# Patient Record
Sex: FEMALE | Race: BLACK OR AFRICAN AMERICAN | NOT HISPANIC OR LATINO | Employment: FULL TIME | URBAN - METROPOLITAN AREA
[De-identification: names, ages, dates, MRNs, and addresses within clinical notes are randomized per-mention and may not be internally consistent; named-entity substitution may affect disease eponyms.]

---

## 2022-02-25 ENCOUNTER — OFFICE VISIT (OUTPATIENT)
Dept: FAMILY MEDICINE | Facility: CLINIC | Age: 57
End: 2022-02-25
Payer: COMMERCIAL

## 2022-02-25 VITALS
HEIGHT: 63 IN | BODY MASS INDEX: 36.73 KG/M2 | RESPIRATION RATE: 16 BRPM | SYSTOLIC BLOOD PRESSURE: 142 MMHG | HEART RATE: 77 BPM | DIASTOLIC BLOOD PRESSURE: 82 MMHG | WEIGHT: 207.3 LBS | OXYGEN SATURATION: 98 % | TEMPERATURE: 97.8 F

## 2022-02-25 DIAGNOSIS — Z12.31 ENCOUNTER FOR SCREENING MAMMOGRAM FOR BREAST CANCER: ICD-10-CM

## 2022-02-25 DIAGNOSIS — Z12.4 SCREENING FOR MALIGNANT NEOPLASM OF CERVIX: ICD-10-CM

## 2022-02-25 DIAGNOSIS — Z11.59 NEED FOR HEPATITIS C SCREENING TEST: ICD-10-CM

## 2022-02-25 DIAGNOSIS — Z13.29 SCREENING FOR THYROID DISORDER: ICD-10-CM

## 2022-02-25 DIAGNOSIS — Z00.00 ROUTINE GENERAL MEDICAL EXAMINATION AT A HEALTH CARE FACILITY: Primary | ICD-10-CM

## 2022-02-25 DIAGNOSIS — E55.9 VITAMIN D DEFICIENCY: ICD-10-CM

## 2022-02-25 DIAGNOSIS — Z13.1 SCREENING FOR DIABETES MELLITUS (DM): ICD-10-CM

## 2022-02-25 DIAGNOSIS — Z13.6 CARDIOVASCULAR SCREENING; LDL GOAL LESS THAN 160: ICD-10-CM

## 2022-02-25 DIAGNOSIS — R03.0 ELEVATED BP WITHOUT DIAGNOSIS OF HYPERTENSION: ICD-10-CM

## 2022-02-25 DIAGNOSIS — Z13.0 SCREENING FOR DISORDER OF BLOOD AND BLOOD-FORMING ORGANS: ICD-10-CM

## 2022-02-25 DIAGNOSIS — Z12.11 SCREEN FOR COLON CANCER: ICD-10-CM

## 2022-02-25 PROBLEM — D25.1 INTRAMURAL LEIOMYOMA OF UTERUS: Status: ACTIVE | Noted: 2017-05-15

## 2022-02-25 LAB
ALBUMIN SERPL-MCNC: 3.8 G/DL (ref 3.4–5)
ALP SERPL-CCNC: 55 U/L (ref 40–150)
ALT SERPL W P-5'-P-CCNC: 20 U/L (ref 0–50)
ANION GAP SERPL CALCULATED.3IONS-SCNC: 6 MMOL/L (ref 3–14)
AST SERPL W P-5'-P-CCNC: 16 U/L (ref 0–45)
BILIRUB SERPL-MCNC: 1.3 MG/DL (ref 0.2–1.3)
BUN SERPL-MCNC: 10 MG/DL (ref 7–30)
CALCIUM SERPL-MCNC: 9 MG/DL (ref 8.5–10.1)
CHLORIDE BLD-SCNC: 108 MMOL/L (ref 94–109)
CHOLEST SERPL-MCNC: 224 MG/DL
CO2 SERPL-SCNC: 26 MMOL/L (ref 20–32)
CREAT SERPL-MCNC: 0.73 MG/DL (ref 0.52–1.04)
ERYTHROCYTE [DISTWIDTH] IN BLOOD BY AUTOMATED COUNT: 12.9 % (ref 10–15)
FASTING STATUS PATIENT QL REPORTED: YES
GFR SERPL CREATININE-BSD FRML MDRD: >90 ML/MIN/1.73M2
GLUCOSE BLD-MCNC: 98 MG/DL (ref 70–99)
HCT VFR BLD AUTO: 43.4 % (ref 35–47)
HDLC SERPL-MCNC: 88 MG/DL
HGB BLD-MCNC: 14.1 G/DL (ref 11.7–15.7)
LDLC SERPL CALC-MCNC: 122 MG/DL
MCH RBC QN AUTO: 29.6 PG (ref 26.5–33)
MCHC RBC AUTO-ENTMCNC: 32.5 G/DL (ref 31.5–36.5)
MCV RBC AUTO: 91 FL (ref 78–100)
NONHDLC SERPL-MCNC: 136 MG/DL
PLATELET # BLD AUTO: 314 10E3/UL (ref 150–450)
POTASSIUM BLD-SCNC: 3.7 MMOL/L (ref 3.4–5.3)
PROT SERPL-MCNC: 7.2 G/DL (ref 6.8–8.8)
RBC # BLD AUTO: 4.77 10E6/UL (ref 3.8–5.2)
SODIUM SERPL-SCNC: 140 MMOL/L (ref 133–144)
TRIGL SERPL-MCNC: 69 MG/DL
TSH SERPL DL<=0.005 MIU/L-ACNC: 0.81 MU/L (ref 0.4–4)
WBC # BLD AUTO: 4 10E3/UL (ref 4–11)

## 2022-02-25 PROCEDURE — 86803 HEPATITIS C AB TEST: CPT | Performed by: NURSE PRACTITIONER

## 2022-02-25 PROCEDURE — 80061 LIPID PANEL: CPT | Performed by: NURSE PRACTITIONER

## 2022-02-25 PROCEDURE — 82306 VITAMIN D 25 HYDROXY: CPT | Performed by: NURSE PRACTITIONER

## 2022-02-25 PROCEDURE — G0145 SCR C/V CYTO,THINLAYER,RESCR: HCPCS | Performed by: NURSE PRACTITIONER

## 2022-02-25 PROCEDURE — 87624 HPV HI-RISK TYP POOLED RSLT: CPT | Performed by: NURSE PRACTITIONER

## 2022-02-25 PROCEDURE — 36415 COLL VENOUS BLD VENIPUNCTURE: CPT | Performed by: NURSE PRACTITIONER

## 2022-02-25 PROCEDURE — 84443 ASSAY THYROID STIM HORMONE: CPT | Performed by: NURSE PRACTITIONER

## 2022-02-25 PROCEDURE — 80053 COMPREHEN METABOLIC PANEL: CPT | Performed by: NURSE PRACTITIONER

## 2022-02-25 PROCEDURE — 99386 PREV VISIT NEW AGE 40-64: CPT | Performed by: NURSE PRACTITIONER

## 2022-02-25 PROCEDURE — 85027 COMPLETE CBC AUTOMATED: CPT | Performed by: NURSE PRACTITIONER

## 2022-02-25 RX ORDER — FAMOTIDINE 20 MG
TABLET ORAL
COMMUNITY

## 2022-02-25 ASSESSMENT — ENCOUNTER SYMPTOMS
DIARRHEA: 0
CONSTIPATION: 0
COUGH: 0
ABDOMINAL PAIN: 0
DIZZINESS: 0
CHILLS: 0
HEMATURIA: 0
HEMATOCHEZIA: 0

## 2022-02-25 ASSESSMENT — PAIN SCALES - GENERAL: PAINLEVEL: NO PAIN (0)

## 2022-02-25 NOTE — RESULT ENCOUNTER NOTE
James De Santiago,    Attached are your test results.  -LDL(bad) cholesterol level is elevated which can increase your heart disease risk.  A diet high in fat and simple carbohydrates, genetics and being overweight can contribute to this. ADVISE: exercising 150 minutes of aerobic exercise per week (30 minutes for 5 days per week or 50 minutes for 3 days per week are options) and eating a low saturated fat/low carbohydrate diet are helpful to improve this. In 12 months, you should recheck your fasting cholesterol panel by scheduling a lab-only appointment.  -Liver and gallbladder tests are normal (ALT,AST, Alk phos, bilirubin), kidney function is normal (Cr, GFR), sodium is normal, potassium is normal, calcium is normal, glucose is normal.  -TSH (thyroid stimulating hormone) level is normal which indicates normal thyroid function.   Please contact us if you have any questions.    Denise Douglas, CNP

## 2022-02-25 NOTE — RESULT ENCOUNTER NOTE
James De Santiago,    Attached are your test results.  -Normal red blood cell (hgb) levels, normal white blood cell count and normal platelet levels.   Please contact us if you have any questions.    Denise Douglas, CNP

## 2022-02-25 NOTE — PROGRESS NOTES
SUBJECTIVE:   CC: Radha De Snatiago is an 56 year old woman who presents for preventive health visit.     Patient has been advised of split billing requirements and indicates understanding: Yes  Healthy Habits:     Getting at least 3 servings of Calcium per day:  Yes    Bi-annual eye exam:  Yes    Dental care twice a year:  NO    Sleep apnea or symptoms of sleep apnea:  None    Diet:  Regular (no restrictions)    Frequency of exercise:  2-3 days/week    Duration of exercise:  30-45 minutes    Taking medications regularly:  Yes    Medication side effects:  Other    PHQ-2 Total Score: 0    Additional concerns today:  No    Establish care     Today's PHQ-2 Score:   PHQ-2 ( 1999 [employer]) 2/25/2022   Q1: Little interest or pleasure in doing things 0   Q2: Feeling down, depressed or hopeless 0   PHQ-2 Score 0   Q1: Little interest or pleasure in doing things Not at all   Q2: Feeling down, depressed or hopeless Not at all   PHQ-2 Score 0       Abuse: Current or Past (Physical, Sexual or Emotional) - No  Do you feel safe in your environment? Yes    Have you ever done Advance Care Planning? (For example, a Health Directive, POLST, or a discussion with a medical provider or your loved ones about your wishes): Yes, advance care planning is on file.    Social History     Tobacco Use     Smoking status: Never Smoker     Smokeless tobacco: Never Used   Substance Use Topics     Alcohol use: Not Currently         Alcohol Use 2/25/2022   Prescreen: >3 drinks/day or >7 drinks/week? Not Applicable       Reviewed orders with patient.  Reviewed health maintenance and updated orders accordingly - Yes  Lab work is in process  Labs reviewed in EPIC  BP Readings from Last 3 Encounters:   02/25/22 (!) 142/82    Wt Readings from Last 3 Encounters:   02/25/22 94 kg (207 lb 4.8 oz)                  Patient Active Problem List   Diagnosis     Chondromalacia of patella     Intramural leiomyoma of uterus     Vitamin D deficiency     Past  Surgical History:   Procedure Laterality Date     HRW HB PF ASPIR/INJ GANGLION CYST Left     wrist       Social History     Tobacco Use     Smoking status: Never Smoker     Smokeless tobacco: Never Used   Substance Use Topics     Alcohol use: Not Currently     Family History   Problem Relation Age of Onset     Hypertension Mother      Thyroid Disease Mother      Coronary Artery Disease Father      Breast Cancer Maternal Aunt      Ovarian Cancer Paternal Aunt      Diabetes No family hx of      Hyperlipidemia No family hx of      Kidney Disease No family hx of      Cerebrovascular Disease No family hx of      Depression No family hx of      Mental Illness No family hx of      Uterine Cancer No family hx of      Prostate Cancer No family hx of      Colorectal Cancer No family hx of      Pancreatic Cancer No family hx of      Lung Cancer No family hx of      Melanoma No family hx of      Autoimmune Disease No family hx of          Current Outpatient Medications   Medication Sig Dispense Refill     Vitamin D, Cholecalciferol, 25 MCG (1000 UT) CAPS        Allergies   Allergen Reactions     Calcitonin (Isabella) Anaphylaxis     Pyrimethamine Anaphylaxis     Pt gets diffuse swelling  (metakelfin)     Other Environmental Allergy      PN: LW Reaction: metacalfin for malaria in [facility]     Acetaminophen Rash       Breast Cancer Screening:    Breast CA Risk Assessment (FHS-7) 2/25/2022   Do you have a family history of breast, colon, or ovarian cancer? No / Unknown         Mammogram Screening: Recommended mammography every 1-2 years with patient discussion and risk factor consideration  Pertinent mammograms are reviewed under the imaging tab.    History of abnormal Pap smear: NO - age 30- 65 PAP every 3 years recommended     Reviewed and updated as needed this visit by clinical staff                  Reviewed and updated as needed this visit by Provider                 Past Medical History:   Diagnosis Date     Chondromalacia of  "patella 4/26/2013      Past Surgical History:   Procedure Laterality Date     HRW HB PF ASPIR/INJ GANGLION CYST Left     wrist       Review of Systems   Constitutional: Negative for chills.   HENT: Negative for congestion.    Respiratory: Negative for cough.    Cardiovascular: Negative for chest pain.   Gastrointestinal: Negative for abdominal pain, constipation, diarrhea and hematochezia.   Genitourinary: Negative for hematuria.   Neurological: Negative for dizziness.     CONSTITUTIONAL:POSITIVE  for weight gain and almost 30 lbs  and NEGATIVE  for sweats  INTEGUMENTARY/SKIN: NEGATIVE for worrisome rashes, moles or lesions  EYES: POSITIVE for wears glasses  and NEGATIVE for blurred vision bilateral, diplopia and eye pain bilateral  ENT: NEGATIVE for ear, mouth and throat problems  RESP:NEGATIVE for significant cough or SOB  BREAST: NEGATIVE for masses, tenderness or discharge  CV: NEGATIVE for chest pain, palpitations or peripheral edema  GI: NEGATIVE for nausea, abdominal pain, heartburn, or change in bowel habits   menopausal female: amenorrhea, no unusual urinary symptoms and no unusual vaginal symptoms  Was seen for endometrial biopsy in 2019. Has hx of fibroids   MUSCULOSKELETAL:NEGATIVE for significant arthralgias or myalgia  NEURO: NEGATIVE for weakness, dizziness or paresthesias  ENDOCRINE: NEGATIVE for temperature intolerance, skin/hair changes  HEME/ALLERGY/IMMUNE: NEGATIVE for bleeding problems  PSYCHIATRIC: NEGATIVE for changes in mood or affect        OBJECTIVE:   BP (!) 152/96 (BP Location: Right arm, Patient Position: Sitting, Cuff Size: Adult Regular)   Pulse 77   Temp 97.8  F (36.6  C)   Resp 16   Ht 1.6 m (5' 3\")   Wt 94 kg (207 lb 4.8 oz)   SpO2 98%   BMI 36.72 kg/m     Wt Readings from Last 4 Encounters:   02/25/22 94 kg (207 lb 4.8 oz)       Physical Exam  GENERAL APPEARANCE: healthy, alert and no distress  EYES: Eyes grossly normal to inspection and conjunctivae and sclerae " normal  HENT: ear canals and TM's normal, nose and mouth without ulcers or lesions, oropharynx clear and oral mucous membranes moist  NECK: no adenopathy, no asymmetry, masses, or scars and thyroid normal to palpation  RESP: lungs clear to auscultation - no rales, rhonchi or wheezes  BREAST: normal without masses, tenderness or nipple discharge and no palpable axillary masses or adenopathy  CV: regular rates and rhythm, no murmur, click or rub, no peripheral edema and peripheral pulses strong  ABDOMEN: soft, nontender, no hepatosplenomegaly, no masses and bowel sounds normal   (female): normal female external genitalia, normal urethral meatus, vaginal mucosal atrophy noted, normal cervix, adnexae, and uterus without masses or abnormal discharge  MS: no musculoskeletal defects are noted and gait is age appropriate without ataxia  SKIN: no suspicious lesions or rashes  NEURO: Normal strength and tone, sensory exam grossly normal, mentation intact and speech normal  PSYCH: mentation appears normal and affect normal/bright    Diagnostic Test Results:  Labs reviewed in Epic  Results for orders placed or performed in visit on 02/25/22   CBC with platelets     Status: Normal   Result Value Ref Range    WBC Count 4.0 4.0 - 11.0 10e3/uL    RBC Count 4.77 3.80 - 5.20 10e6/uL    Hemoglobin 14.1 11.7 - 15.7 g/dL    Hematocrit 43.4 35.0 - 47.0 %    MCV 91 78 - 100 fL    MCH 29.6 26.5 - 33.0 pg    MCHC 32.5 31.5 - 36.5 g/dL    RDW 12.9 10.0 - 15.0 %    Platelet Count 314 150 - 450 10e3/uL   Pending orders and results       ASSESSMENT/PLAN:   Radha was seen today for physical and establish care.    Diagnoses and all orders for this visit:    Routine general medical examination at a health care facility  -     REVIEW OF HEALTH MAINTENANCE PROTOCOL ORDERS    Elevated BP without diagnosis of hypertension  Discussed sodium restriction, maintaining ideal body weight and regular exercise program as physiologic means to achieve  blood pressure control.  The pt will strive towards this.    The pt indicates understanding of these issues and agrees with the plan.  Side effects explained in detail.  Continue home readings and return in 1-2 months.  Discussed long term complications of untreated htn.    Screen for colon cancer  -     COLOGUARD([employer])    CARDIOVASCULAR SCREENING; LDL GOAL LESS THAN 160  -     Lipid panel reflex to direct LDL Fasting; Future  -     Lipid panel reflex to direct LDL Fasting    Screening for diabetes mellitus (DM)  -     Comprehensive metabolic panel; Future  -     Comprehensive metabolic panel    Screening for thyroid disorder  -     TSH with free T4 reflex; Future  -     TSH with free T4 reflex    Screening for disorder of blood and blood-forming organs  -     CBC with platelets; Future  -     CBC with platelets    Encounter for screening mammogram for breast cancer  -     MA SCREENING DIGITAL BILAT - Future  (s+30); Future    Screening for malignant neoplasm of cervix  -     Pap screen with HPV - recommended age 30 - 65 years    Vitamin D deficiency  -     Vitamin D Deficiency; Future  -     Vitamin D Deficiency    Need for hepatitis C screening test  -     Hepatitis C Screen Reflex to HCV RNA Quant and Genotype; Future  -     Hepatitis C Screen Reflex to HCV RNA Quant and Genotype    PLAN:   1.   Symptomatic therapy suggested: Increase calcium to 1000mg and 1000iu Vit D  Watch BP and if persistently elevated send me a message and will start on medication  2.  Orders Placed This Encounter   Medications     Vitamin D, Cholecalciferol, 25 MCG (1000 UT) CAPS     Orders Placed This Encounter   Procedures     REVIEW OF HEALTH MAINTENANCE PROTOCOL ORDERS     MA SCREENING DIGITAL BILAT - Future  (s+30)     Lipid panel reflex to direct LDL Fasting     Comprehensive metabolic panel     CBC with platelets     TSH with free T4 reflex     Vitamin D Deficiency     COLOGUARD([employer])     Hepatitis C Screen  "Reflex to HCV RNA Quant and Genotype       3. Patient needs to follow up in if no improvement,or sooner if worsening of symptoms or other symptoms develop.  FURTHER TESTING:       - mammogram  I will place order. Please call [phone] to schedule.  Work on weight loss  Regular exercise  Will follow up and/or notify patient of  results via My Chart to determine further need for followup  Follow up office visit in one year for annual health maintenance exam, sooner PRN.      Patient has been advised of split billing requirements and indicates understanding: Yes    COUNSELING:  Reviewed preventive health counseling, as reflected in patient instructions  Special attention given to:        Regular exercise       Healthy diet/nutrition       Vision screening       Osteoporosis prevention/bone health       Colorectal Cancer Screening       Consider Hep C screening for all patients one time for ages 18-79 years       The ASCVD Risk score (Shelia CONDON Jr., et al., 2013) failed to calculate for the following reasons:    Cannot find a previous HDL lab    Cannot find a previous total cholesterol lab       Advance Care Planning    Estimated body mass index is 36.72 kg/m  as calculated from the following:    Height as of this encounter: 1.6 m (5' 3\").    Weight as of this encounter: 94 kg (207 lb 4.8 oz).    Weight management plan: Discussed healthy diet and exercise guidelines    She reports that she has never smoked. She has never used smokeless tobacco.      Counseling Resources:  ATP IV Guidelines  Pooled Cohorts Equation Calculator  Breast Cancer Risk Calculator  BRCA-Related Cancer Risk Assessment: FHS-7 Tool  FRAX Risk Assessment  ICSI Preventive Guidelines  Dietary Guidelines for Americans, 2010  USDA's MyPlate  ASA Prophylaxis  Lung CA Screening    [facility]  "

## 2022-02-25 NOTE — PATIENT INSTRUCTIONS
PLAN:   1.   Symptomatic therapy suggested: Increase calcium to 1000mg and 1000iu Vit D  Watch BP and if persistently elevated send me a message and will start on medication  2.  Orders Placed This Encounter   Medications     Vitamin D, Cholecalciferol, 25 MCG (1000 UT) CAPS     Orders Placed This Encounter   Procedures     REVIEW OF HEALTH MAINTENANCE PROTOCOL ORDERS     MA SCREENING DIGITAL BILAT - Future  (s+30)     Lipid panel reflex to direct LDL Fasting     Comprehensive metabolic panel     CBC with platelets     TSH with free T4 reflex     Vitamin D Deficiency     COLOGUARD([employer])     Hepatitis C Screen Reflex to HCV RNA Quant and Genotype       3. Patient needs to follow up in if no improvement,or sooner if worsening of symptoms or other symptoms develop.  FURTHER TESTING:       - mammogram  I will place order. Please call [phone] to schedule.  Work on weight loss  Regular exercise  Will follow up and/or notify patient of  results via My Chart to determine further need for followup  Follow up office visit in one year for annual health maintenance exam, sooner PRN.    Preventive Health Recommendations  Female Ages 50 - 64    Yearly exam: See your health care provider every year in order to  o Review health changes.   o Discuss preventive care.    o Review your medicines if your doctor has prescribed any.      Get a Pap test every three years (unless you have an abnormal result and your provider advises testing more often).    If you get Pap tests with HPV test, you only need to test every 5 years, unless you have an abnormal result.     You do not need a Pap test if your uterus was removed (hysterectomy) and you have not had cancer.    You should be tested each year for STDs (sexually transmitted diseases) if you're at risk.     Have a mammogram every 1 to 2 years.    Have a colonoscopy at age 50, or have a yearly FIT test (stool test). These exams screen for colon cancer.      Have a  cholesterol test every 5 years, or more often if advised.    Have a diabetes test (fasting glucose) every three years. If you are at risk for diabetes, you should have this test more often.     If you are at risk for osteoporosis (brittle bone disease), think about having a bone density scan (DEXA).    Shots: Get a flu shot each year. Get a tetanus shot every 10 years.    Nutrition:     Eat at least 5 servings of fruits and vegetables each day.    Eat whole-grain bread, whole-wheat pasta and brown rice instead of white grains and rice.    Get adequate Calcium and Vitamin D.     Lifestyle    Exercise at least 150 minutes a week (30 minutes a day, 5 days a week). This will help you control your weight and prevent disease.    Limit alcohol to one drink per day.    No smoking.     Wear sunscreen to prevent skin cancer.     See your dentist every six months for an exam and cleaning.    See your eye doctor every 1 to 2 years.

## 2022-02-26 LAB
DEPRECATED CALCIDIOL+CALCIFEROL SERPL-MC: 46 UG/L (ref 20–75)
HCV AB SERPL QL IA: NONREACTIVE

## 2022-02-28 NOTE — RESULT ENCOUNTER NOTE
James De Santiago,    Attached are your test results.  -Hepatitis C antibody screen test shows no signs of a previous hepatitis C infection.  -Vitamin D level is normal and getting 1000 IU daily in your diet or supplements is recommended.    Please contact us if you have any questions.    Denise Douglas, CNP

## 2022-03-01 LAB
BKR LAB AP GYN ADEQUACY: NORMAL
BKR LAB AP GYN INTERPRETATION: NORMAL
BKR LAB AP HPV REFLEX: NORMAL
BKR LAB AP PREVIOUS ABNORMAL: NORMAL
PATH REPORT.COMMENTS IMP SPEC: NORMAL
PATH REPORT.COMMENTS IMP SPEC: NORMAL
PATH REPORT.RELEVANT HX SPEC: NORMAL

## 2022-03-02 LAB
HUMAN PAPILLOMA VIRUS 16 DNA: NEGATIVE
HUMAN PAPILLOMA VIRUS 18 DNA: NEGATIVE
HUMAN PAPILLOMA VIRUS FINAL DIAGNOSIS: NORMAL
HUMAN PAPILLOMA VIRUS OTHER HR: NEGATIVE

## 2022-03-07 LAB — COLOGUARD-ABSTRACT: NEGATIVE

## 2022-03-16 NOTE — RESULT ENCOUNTER NOTE
James De Santiago,    Attached are your test results.  Cologuard is negative    Please contact us if you have any questions.    Denise Douglas, CNP

## 2022-04-03 ENCOUNTER — HEALTH MAINTENANCE LETTER (OUTPATIENT)
Age: 57
End: 2022-04-03

## 2022-04-04 ENCOUNTER — ANCILLARY PROCEDURE (OUTPATIENT)
Dept: MAMMOGRAPHY | Facility: CLINIC | Age: 57
End: 2022-04-04
Attending: NURSE PRACTITIONER
Payer: COMMERCIAL

## 2022-04-04 DIAGNOSIS — Z12.31 ENCOUNTER FOR SCREENING MAMMOGRAM FOR BREAST CANCER: ICD-10-CM

## 2022-04-04 PROCEDURE — 77067 SCR MAMMO BI INCL CAD: CPT | Mod: GC | Performed by: RADIOLOGY

## 2022-04-04 PROCEDURE — 77063 BREAST TOMOSYNTHESIS BI: CPT | Mod: GC | Performed by: RADIOLOGY

## 2023-05-20 ENCOUNTER — HEALTH MAINTENANCE LETTER (OUTPATIENT)
Age: 58
End: 2023-05-20

## 2023-07-20 ENCOUNTER — OFFICE VISIT (OUTPATIENT)
Dept: FAMILY MEDICINE | Facility: CLINIC | Age: 58
End: 2023-07-20
Payer: COMMERCIAL

## 2023-07-20 VITALS
TEMPERATURE: 98 F | HEIGHT: 63 IN | HEART RATE: 73 BPM | OXYGEN SATURATION: 99 % | WEIGHT: 191 LBS | SYSTOLIC BLOOD PRESSURE: 142 MMHG | BODY MASS INDEX: 33.84 KG/M2 | DIASTOLIC BLOOD PRESSURE: 88 MMHG | RESPIRATION RATE: 16 BRPM

## 2023-07-20 DIAGNOSIS — Z13.1 SCREENING FOR DIABETES MELLITUS (DM): ICD-10-CM

## 2023-07-20 DIAGNOSIS — R03.0 ELEVATED BP WITHOUT DIAGNOSIS OF HYPERTENSION: ICD-10-CM

## 2023-07-20 DIAGNOSIS — E66.09 CLASS 1 OBESITY DUE TO EXCESS CALORIES WITH SERIOUS COMORBIDITY AND BODY MASS INDEX (BMI) OF 33.0 TO 33.9 IN ADULT: ICD-10-CM

## 2023-07-20 DIAGNOSIS — Z13.6 CARDIOVASCULAR SCREENING; LDL GOAL LESS THAN 160: ICD-10-CM

## 2023-07-20 DIAGNOSIS — Z12.31 ENCOUNTER FOR SCREENING MAMMOGRAM FOR BREAST CANCER: ICD-10-CM

## 2023-07-20 DIAGNOSIS — E66.811 CLASS 1 OBESITY DUE TO EXCESS CALORIES WITH SERIOUS COMORBIDITY AND BODY MASS INDEX (BMI) OF 33.0 TO 33.9 IN ADULT: ICD-10-CM

## 2023-07-20 DIAGNOSIS — Z13.29 SCREENING FOR THYROID DISORDER: ICD-10-CM

## 2023-07-20 DIAGNOSIS — Z13.0 SCREENING FOR DISORDER OF BLOOD AND BLOOD-FORMING ORGANS: ICD-10-CM

## 2023-07-20 DIAGNOSIS — Z00.00 ROUTINE GENERAL MEDICAL EXAMINATION AT A HEALTH CARE FACILITY: Primary | ICD-10-CM

## 2023-07-20 LAB
ALBUMIN SERPL BCG-MCNC: 4.3 G/DL (ref 3.5–5.2)
ALP SERPL-CCNC: 63 U/L (ref 35–104)
ALT SERPL W P-5'-P-CCNC: 11 U/L (ref 0–50)
ANION GAP SERPL CALCULATED.3IONS-SCNC: 9 MMOL/L (ref 7–15)
AST SERPL W P-5'-P-CCNC: 25 U/L (ref 0–45)
BILIRUB SERPL-MCNC: 1.2 MG/DL
BUN SERPL-MCNC: 10.4 MG/DL (ref 6–20)
CALCIUM SERPL-MCNC: 9.5 MG/DL (ref 8.6–10)
CHLORIDE SERPL-SCNC: 107 MMOL/L (ref 98–107)
CHOLEST SERPL-MCNC: 218 MG/DL
CREAT SERPL-MCNC: 0.92 MG/DL (ref 0.51–0.95)
DEPRECATED HCO3 PLAS-SCNC: 27 MMOL/L (ref 22–29)
ERYTHROCYTE [DISTWIDTH] IN BLOOD BY AUTOMATED COUNT: 12.5 % (ref 10–15)
GFR SERPL CREATININE-BSD FRML MDRD: 72 ML/MIN/1.73M2
GLUCOSE SERPL-MCNC: 101 MG/DL (ref 70–99)
HCT VFR BLD AUTO: 44 % (ref 35–47)
HDLC SERPL-MCNC: 81 MG/DL
HGB BLD-MCNC: 14.4 G/DL (ref 11.7–15.7)
LDLC SERPL CALC-MCNC: 125 MG/DL
MCH RBC QN AUTO: 29.6 PG (ref 26.5–33)
MCHC RBC AUTO-ENTMCNC: 32.7 G/DL (ref 31.5–36.5)
MCV RBC AUTO: 91 FL (ref 78–100)
NONHDLC SERPL-MCNC: 137 MG/DL
PLATELET # BLD AUTO: 337 10E3/UL (ref 150–450)
POTASSIUM SERPL-SCNC: 3.9 MMOL/L (ref 3.4–5.3)
PROT SERPL-MCNC: 6.8 G/DL (ref 6.4–8.3)
RBC # BLD AUTO: 4.86 10E6/UL (ref 3.8–5.2)
SODIUM SERPL-SCNC: 143 MMOL/L (ref 136–145)
TRIGL SERPL-MCNC: 61 MG/DL
TSH SERPL DL<=0.005 MIU/L-ACNC: 0.97 UIU/ML (ref 0.3–4.2)
WBC # BLD AUTO: 4 10E3/UL (ref 4–11)

## 2023-07-20 PROCEDURE — 99396 PREV VISIT EST AGE 40-64: CPT | Performed by: NURSE PRACTITIONER

## 2023-07-20 PROCEDURE — 99213 OFFICE O/P EST LOW 20 MIN: CPT | Mod: 25 | Performed by: NURSE PRACTITIONER

## 2023-07-20 PROCEDURE — 84443 ASSAY THYROID STIM HORMONE: CPT | Performed by: NURSE PRACTITIONER

## 2023-07-20 PROCEDURE — 85027 COMPLETE CBC AUTOMATED: CPT | Performed by: NURSE PRACTITIONER

## 2023-07-20 PROCEDURE — 80061 LIPID PANEL: CPT | Performed by: NURSE PRACTITIONER

## 2023-07-20 PROCEDURE — 36415 COLL VENOUS BLD VENIPUNCTURE: CPT | Performed by: NURSE PRACTITIONER

## 2023-07-20 PROCEDURE — 80053 COMPREHEN METABOLIC PANEL: CPT | Performed by: NURSE PRACTITIONER

## 2023-07-20 ASSESSMENT — ENCOUNTER SYMPTOMS
DIZZINESS: 0
ARTHRALGIAS: 1
DIARRHEA: 0
FEVER: 0
MYALGIAS: 0
EYE PAIN: 0
PARESTHESIAS: 0
NERVOUS/ANXIOUS: 0
ABDOMINAL PAIN: 0
PALPITATIONS: 0
BREAST MASS: 0
NAUSEA: 0
WEAKNESS: 0
FREQUENCY: 0
HEMATURIA: 0
HEADACHES: 0
SHORTNESS OF BREATH: 0
CONSTIPATION: 0
DYSURIA: 0
CHILLS: 0
HEARTBURN: 0
HEMATOCHEZIA: 0
COUGH: 0
JOINT SWELLING: 0
SORE THROAT: 0

## 2023-07-20 ASSESSMENT — PAIN SCALES - GENERAL: PAINLEVEL: NO PAIN (0)

## 2023-07-20 NOTE — PATIENT INSTRUCTIONS
PLAN:   1.   Symptomatic therapy suggested: Increase calcium to 1000mg and 1000 iu Vit D  Will try Wegovy   2.  Orders Placed This Encounter   Medications    Semaglutide-Weight Management (WEGOVY) 0.25 MG/0.5ML pen     Sig: Inject 0.25 mg Subcutaneous once a week     Dispense:  2 mL     Refill:  0     Orders Placed This Encounter   Procedures    REVIEW OF HEALTH MAINTENANCE PROTOCOL ORDERS    *MA Screening Digital Bilateral    Lipid panel reflex to direct LDL Fasting    CBC with platelets    Comprehensive metabolic panel    TSH with free T4 reflex       3.PLAN:  FURTHER TESTING:       - mammogram  Work on weight loss  Regular exercise  Follow up in 3 months   Follow up nurse visit in 1 month for blood pressure check.  Follow up office visit in one year for annual health maintenance exam, sooner PRN.   Patient needs to follow up in if no improvement,or sooner if worsening of symptoms or other symptoms develop.    Preventive Health Recommendations  Female Ages 50 - 64    Yearly exam: See your health care provider every year in order to  Review health changes.   Discuss preventive care.    Review your medicines if your doctor has prescribed any.    Get a Pap test every three years (unless you have an abnormal result and your provider advises testing more often).  If you get Pap tests with HPV test, you only need to test every 5 years, unless you have an abnormal result.   You do not need a Pap test if your uterus was removed (hysterectomy) and you have not had cancer.  You should be tested each year for STDs (sexually transmitted diseases) if you're at risk.   Have a mammogram every 1 to 2 years.  Have a colonoscopy at age 50, or have a yearly FIT test (stool test). These exams screen for colon cancer.    Have a cholesterol test every 5 years, or more often if advised.  Have a diabetes test (fasting glucose) every three years. If you are at risk for diabetes, you should have this test more often.   If you are at risk  for osteoporosis (brittle bone disease), think about having a bone density scan (DEXA).    Shots: Get a flu shot each year. Get a tetanus shot every 10 years.    Nutrition:   Eat at least 5 servings of fruits and vegetables each day.  Eat whole-grain bread, whole-wheat pasta and brown rice instead of white grains and rice.  Get adequate Calcium and Vitamin D.     Lifestyle  Exercise at least 150 minutes a week (30 minutes a day, 5 days a week). This will help you control your weight and prevent disease.  Limit alcohol to one drink per day.  No smoking.   Wear sunscreen to prevent skin cancer.   See your dentist every six months for an exam and cleaning.  See your eye doctor every 1 to 2 years.

## 2023-07-20 NOTE — PROGRESS NOTES
SUBJECTIVE:   CC: Radha is an 57 year old who presents for preventive health visit.       7/20/2023     8:17 AM   Additional Questions   Roomed by Kelsea SILVA   Accompanied by Self         7/20/2023     8:17 AM   Patient Reported Additional Medications   Patient reports taking the following new medications None     Healthy Habits:     Getting at least 3 servings of Calcium per day:  Yes    Bi-annual eye exam:  Yes    Dental care twice a year:  NO    Sleep apnea or symptoms of sleep apnea:  None    Diet:  Regular (no restrictions)    Frequency of exercise:  2-3 days/week    Duration of exercise:  30-45 minutes    Taking medications regularly:  Yes    Medication side effects:  None    Additional concerns today:  Yes      Today's PHQ-2 Score:       7/20/2023     8:07 AM   PHQ-2 ( 1999 [employer])   Q1: Little interest or pleasure in doing things 0   Q2: Feeling down, depressed or hopeless 0   PHQ-2 Score 0   Q1: Little interest or pleasure in doing things Not at all   Q2: Feeling down, depressed or hopeless Not at all   PHQ-2 Score 0         Social History     Tobacco Use     Smoking status: Never     Smokeless tobacco: Never   Substance Use Topics     Alcohol use: Not Currently             7/20/2023     8:07 AM   Alcohol Use   Prescreen: >3 drinks/day or >7 drinks/week? Not Applicable     Reviewed orders with patient.  Reviewed health maintenance and updated orders accordingly - Yes  Lab work is in process  Labs reviewed in EPIC  BP Readings from Last 3 Encounters:   07/20/23 (!) 142/88   02/25/22 (!) 142/82    Wt Readings from Last 3 Encounters:   07/20/23 86.6 kg (191 lb)   02/25/22 94 kg (207 lb 4.8 oz)                  Patient Active Problem List   Diagnosis     Chondromalacia of patella     Intramural leiomyoma of uterus     Vitamin D deficiency     Past Surgical History:   Procedure Laterality Date     HRW HB PF ASPIR/INJ GANGLION CYST Left     wrist       Social History     Tobacco Use     Smoking status: Never      Smokeless tobacco: Never   Substance Use Topics     Alcohol use: Not Currently     Family History   Problem Relation Age of Onset     Hypertension Mother      Thyroid Disease Mother      Coronary Artery Disease Father      Breast Cancer Maternal Aunt      Ovarian Cancer Paternal Aunt      Diabetes No family hx of      Hyperlipidemia No family hx of      Kidney Disease No family hx of      Cerebrovascular Disease No family hx of      Depression No family hx of      Mental Illness No family hx of      Uterine Cancer No family hx of      Prostate Cancer No family hx of      Colorectal Cancer No family hx of      Pancreatic Cancer No family hx of      Lung Cancer No family hx of      Melanoma No family hx of      Autoimmune Disease No family hx of          Current Outpatient Medications   Medication Sig Dispense Refill     Vitamin D, Cholecalciferol, 25 MCG (1000 UT) CAPS        Allergies   Allergen Reactions     Calcitonin ([place]) Anaphylaxis     Pyrimethamine Anaphylaxis     Pt gets diffuse swelling  (metakelfin)     Other Environmental Allergy      PN: LW Reaction: metacalfin for malaria in [place]     Acetaminophen Rash       Breast Cancer Screenin/25/2022     6:59 AM   Breast CA Risk Assessment (FHS-7)   Do you have a family history of breast, colon, or ovarian cancer? No / Unknown         Mammogram Screening: Recommended mammography every 1-2 years with patient discussion and risk factor consideration  Pertinent mammograms are reviewed under the imaging tab.    History of abnormal Pap smear: NO - age 30-65 PAP every 5 years with negative HPV co-testing recommended      Latest Ref Rng & Units 2022     7:29 AM   PAP / HPV   PAP  Negative for Intraepithelial Lesion or Malignancy (NILM)    HPV 16 DNA Negative Negative    HPV 18 DNA Negative Negative    Other HR HPV Negative Negative      Reviewed and updated as needed this visit by clinical staff   Tobacco  Allergies  Meds  Problems          "    Reviewed and updated as needed this visit by Provider      Problems            Past Medical History:   Diagnosis Date     Chondromalacia of patella 4/26/2013      Past Surgical History:   Procedure Laterality Date     HRW HB PF ASPIR/INJ GANGLION CYST Left     wrist       Review of Systems   Constitutional:  Negative for chills and fever.   HENT:  Negative for congestion, ear pain, hearing loss and sore throat.    Eyes:  Positive for visual disturbance. Negative for pain.   Respiratory:  Negative for cough and shortness of breath.    Cardiovascular:  Negative for chest pain, palpitations and peripheral edema.   Gastrointestinal:  Negative for abdominal pain, constipation, diarrhea, heartburn, hematochezia and nausea.   Breasts:  Negative for tenderness, breast mass and discharge.   Genitourinary:  Negative for dysuria, frequency, genital sores, hematuria, pelvic pain, urgency, vaginal bleeding and vaginal discharge.   Musculoskeletal:  Positive for arthralgias. Negative for joint swelling and myalgias.   Skin:  Negative for rash.   Neurological:  Negative for dizziness, weakness, headaches and paresthesias.   Psychiatric/Behavioral:  Negative for mood changes. The patient is not nervous/anxious.        OBJECTIVE:   BP (!) 142/88 (BP Location: Right arm, Patient Position: Sitting, Cuff Size: Adult Large)   Pulse 73   Temp 98  F (36.7  C) (Oral)   Resp 16   Ht 1.6 m (5' 3\")   Wt 86.6 kg (191 lb)   SpO2 99%   BMI 33.83 kg/m    Physical Exam  GENERAL APPEARANCE: healthy, alert and no distress  EYES: Eyes grossly normal to inspection, PERRL and conjunctivae and sclerae normal  HENT: ear canals and TM's normal, nose and mouth without ulcers or lesions, oropharynx clear and oral mucous membranes moist  NECK: no adenopathy, no asymmetry, masses, or scars and thyroid normal to palpation  RESP: lungs clear to auscultation - no rales, rhonchi or wheezes  BREAST: normal without masses, tenderness or nipple discharge " and no palpable axillary masses or adenopathy  CV: regular rate and rhythm, normal S1 S2, no S3 or S4, no murmur, click or rub, no peripheral edema and peripheral pulses strong  ABDOMEN: soft, nontender, no hepatosplenomegaly, no masses and bowel sounds normal   (female): normal female external genitalia, normal urethral meatus, vaginal mucosal atrophy noted, and normal cervix, adnexae, and uterus without masses.  MS: no musculoskeletal defects are noted and gait is age appropriate without ataxia  SKIN: no suspicious lesions or rashes  NEURO: Normal strength and tone, sensory exam grossly normal, mentation intact and speech normal  PSYCH: mentation appears normal and affect normal/bright    Diagnostic Test Results:  Labs reviewed in Epic    ASSESSMENT/PLAN:   Radha was seen today for physical.    Diagnoses and all orders for this visit:    Routine general medical examination at a health care facility  -     REVIEW OF HEALTH MAINTENANCE PROTOCOL ORDERS    Encounter for screening mammogram for breast cancer  -     *MA Screening Digital Bilateral; Future    Elevated BP without diagnosis of hypertension  Discussed sodium restriction, maintaining ideal body weight and regular exercise program as physiologic means to achieve blood pressure control.  The pt will strive towards this.  The pt indicates understanding of these issues and agrees with the plan.  Side effects explained in detail.  Continue home readings and return in 1-2 months.  Discussed long term complications of untreated htn.    CARDIOVASCULAR SCREENING; LDL GOAL LESS THAN 160  -   -     Lipid panel reflex to direct LDL Fasting    Screening for thyroid disorder  -   -     TSH with free T4 reflex    Screening for diabetes mellitus (DM)  -    -     Comprehensive metabolic panel    Screening for disorder of blood and blood-forming organs  -  -     CBC with platelets    Class 1 obesity due to excess calories with serious comorbidity and body mass index  "(BMI) of 33.0 to 33.9 in adult  Healthy diet and exercise reviewed.  Limit pop and juice intake.  Risks of obesity discussed.  Encourage exercise.  Limit TV/Computer/game time to one hour a day.  Will Start:  -     Semaglutide-Weight Management (WEGOVY) 0.25 MG/0.5ML pen; Inject 0.25 mg Subcutaneous once a week    PLAN:   FURTHER TESTING:       - mammogram  Work on weight loss  Regular exercise  Follow up in 3 months   Follow up nurse visit in 1 month for blood pressure check.  Follow up office visit in one year for annual health maintenance exam, sooner PRN.   Patient needs to follow up in if no improvement,or sooner if worsening of symptoms or other symptoms develop.    Patient has been advised of split billing requirements and indicates understanding: Yes      COUNSELING:  Reviewed preventive health counseling, as reflected in patient instructions  Special attention given to:        Regular exercise       Healthy diet/nutrition       Vision screening       Osteoporosis prevention/bone health       Colorectal Cancer Screening       The 10-year ASCVD risk score (Junior MATIAS, et al., 2019) is: 4.3%    Values used to calculate the score:      Age: 57 years      Sex: Female      Is Non- : Yes      Diabetic: No      Tobacco smoker: No      Systolic Blood Pressure: 142 mmHg      Is BP treated: No      HDL Cholesterol: 81 mg/dL      Total Cholesterol: 218 mg/dL       Advance Care Planning      BMI:   Estimated body mass index is 33.83 kg/m  as calculated from the following:    Height as of this encounter: 1.6 m (5' 3\").    Weight as of this encounter: 86.6 kg (191 lb).   Weight management plan: Discussed healthy diet and exercise guidelines      She reports that she has never smoked. She has never used smokeless tobacco.          VITALY [facility]"

## 2023-07-24 ENCOUNTER — MYC MEDICAL ADVICE (OUTPATIENT)
Dept: FAMILY MEDICINE | Facility: CLINIC | Age: 58
End: 2023-07-24
Payer: COMMERCIAL

## 2023-07-24 ENCOUNTER — TELEPHONE (OUTPATIENT)
Dept: FAMILY MEDICINE | Facility: CLINIC | Age: 58
End: 2023-07-24

## 2023-07-24 DIAGNOSIS — E66.811 CLASS 1 OBESITY DUE TO EXCESS CALORIES WITH SERIOUS COMORBIDITY AND BODY MASS INDEX (BMI) OF 33.0 TO 33.9 IN ADULT: Primary | ICD-10-CM

## 2023-07-24 DIAGNOSIS — E66.09 CLASS 1 OBESITY DUE TO EXCESS CALORIES WITH SERIOUS COMORBIDITY AND BODY MASS INDEX (BMI) OF 33.0 TO 33.9 IN ADULT: Primary | ICD-10-CM

## 2023-07-24 NOTE — TELEPHONE ENCOUNTER
Semaglutide-Weight Management (WEGOVY) 0.25 MG/0.5ML pen  Pharmacy Message: Med on indefinite backorder

## 2023-07-24 NOTE — RESULT ENCOUNTER NOTE
James De Santiago,    Attached are your test results.  -LDL(bad) cholesterol level is elevated which can increase your heart disease risk.  A diet high in fat and simple carbohydrates, genetics and being overweight can contribute to this. ADVISE: exercising 150 minutes of aerobic exercise per week (30 minutes for 5 days per week or 50 minutes for 3 days per week are options) and eating a low saturated fat/low carbohydrate diet are helpful to improve this. In 12 months, you should recheck your fasting cholesterol panel by scheduling a lab-only appointment.  -Liver and gallbladder tests (ALT,AST, Alk phos,bilirubin) are normal.  -Kidney function (GFR) is normal.  -Sodium is normal.  -Potassium is normal.  -Calcium is normal.  -Glucose is slight elevated and may be a sign of early diabetes (prediabetes). ADVISE:: eating a low carbohydrate diet, exercising, trying to lose weight (if necessary) and rechecking your glucose level in 12 months.  -TSH (thyroid stimulating hormone) level is normal which indicates normal thyroid function.   Please contact us if you have any questions.    Denise Douglas, CNP

## 2023-07-25 NOTE — TELEPHONE ENCOUNTER
See other MyChart message 7/24/2023. Concern addressed in the MyChart. No further action needed.    GAMAL Haas  [facility] Primary Care Triage

## 2023-08-02 ENCOUNTER — ANCILLARY PROCEDURE (OUTPATIENT)
Dept: MAMMOGRAPHY | Facility: CLINIC | Age: 58
End: 2023-08-02
Attending: NURSE PRACTITIONER
Payer: COMMERCIAL

## 2023-08-02 DIAGNOSIS — Z12.31 ENCOUNTER FOR SCREENING MAMMOGRAM FOR BREAST CANCER: ICD-10-CM

## 2023-08-02 DIAGNOSIS — Z12.31 VISIT FOR SCREENING MAMMOGRAM: ICD-10-CM

## 2023-08-02 PROCEDURE — 77067 SCR MAMMO BI INCL CAD: CPT | Mod: TC | Performed by: RADIOLOGY

## 2023-08-02 PROCEDURE — 77063 BREAST TOMOSYNTHESIS BI: CPT | Mod: TC | Performed by: RADIOLOGY

## 2023-09-20 ENCOUNTER — MYC MEDICAL ADVICE (OUTPATIENT)
Dept: FAMILY MEDICINE | Facility: CLINIC | Age: 58
End: 2023-09-20
Payer: COMMERCIAL

## 2023-09-20 DIAGNOSIS — E66.811 CLASS 1 OBESITY DUE TO EXCESS CALORIES WITH SERIOUS COMORBIDITY AND BODY MASS INDEX (BMI) OF 33.0 TO 33.9 IN ADULT: ICD-10-CM

## 2023-09-20 DIAGNOSIS — E66.09 CLASS 1 OBESITY DUE TO EXCESS CALORIES WITH SERIOUS COMORBIDITY AND BODY MASS INDEX (BMI) OF 33.0 TO 33.9 IN ADULT: ICD-10-CM

## 2023-09-21 NOTE — TELEPHONE ENCOUNTER
Medication and updated pharmacy queued. Routing to refill pool to review.    GAMAL Haas  [facility] Primary Care Triage

## 2023-09-22 ENCOUNTER — TELEPHONE (OUTPATIENT)
Dept: FAMILY MEDICINE | Facility: CLINIC | Age: 58
End: 2023-09-22

## 2023-09-22 NOTE — TELEPHONE ENCOUNTER
Prior authorization needed for Semaglutide-Weight Management (WEGOVY) 0.25 MG/0.5ML pen .    See [employer] message on 9/20/23.    Joshua Sprague RN

## 2023-09-26 ENCOUNTER — TELEPHONE (OUTPATIENT)
Dept: FAMILY MEDICINE | Facility: CLINIC | Age: 58
End: 2023-09-26
Payer: COMMERCIAL

## 2023-09-26 NOTE — TELEPHONE ENCOUNTER
Prior Authorization Approval        Authorization Effective Date: 8/27/2023  Authorization Expiration Date: 4/23/2024  Medication: Semaglutide-Weight Management (WEGOVY) 0.25 MG/0.5ML pen (PRIMARY INS [employer])-PA APPROVED   Approved Dose/Quantity:   Reference #:     Insurance Company: [employer] Non-Specialty PA's - Phone [phone] Fax [phone]  Expected CoPay:       CoPay Card Available:      Foundation Assistance Needed:    Which Pharmacy is filling the prescription (Not needed for infusion/clinic administered): [id number] [place] [facility] [address]  Pharmacy Notified:    Patient Notified:

## 2023-09-26 NOTE — TELEPHONE ENCOUNTER
Prior Authorization Approval    Authorization Effective Date: 8/27/2023  Authorization Expiration Date: 9/25/2024  Medication: Semaglutide-Weight Management (WEGOVY) 0.25 MG/0.5ML pen  Approved Dose/Quantity:   Reference #:     Insurance Company: [employer] - Phone [phone] Fax [phone]  Expected CoPay:       CoPay Card Available:      Foundation Assistance Needed:    Which Pharmacy is filling the prescription (Not needed for infusion/clinic administered): [id number] [place] [facility] - [place], [place] - [address]  Pharmacy Notified:  Yes- **Instructed pharmacy to notify patient when script is ready to /ship.**- Pharmacy stated that they are getting a rejection to submit to other payor/insurance Express scripts. Will open new encounter for PA Request to other insurance [employer].   Patient Notified:  Yes

## 2023-09-26 NOTE — TELEPHONE ENCOUNTER
Central Prior Authorization Team   Phone: [phone]    PA Initiation    Medication: Semaglutide-Weight Management (WEGOVY) 0.25 MG/0.5ML pen (PRIMARY INS [employer])  Insurance Company: [employer] Non-Specialty PA's - Phone [phone] Fax [phone]  Ph[id number] [place], [place] - [address]  Filling Pharmacy Phone: [phone]  Filling Pharmacy Fax:    Start Date: 9/26/2023

## 2023-09-26 NOTE — TELEPHONE ENCOUNTER
Prior Authorization Retail Medication Request    Medication/Dose: Semaglutide-Weight Management (WEGOVY) 0.25 MG/0.5ML pen (PRIMARY INS [employer])  ICD code (if different than what is on RX):    Previously Tried and Failed:    Rationale:      Insurance Name:  [employer]  Insurance ID:  [id number]      Pharmacy Information (if different than what is on RX)  Name:  [address] [address]     Phone:  [phone]

## 2023-09-26 NOTE — TELEPHONE ENCOUNTER
Central Prior Authorization Team   Phone: [phone]    PA Initiation    Medication: Semaglutide-Weight Management (WEGOVY) 0.25 MG/0.5ML pen  Insurance Company: [employer] - Phone [phone] Fax [phone]  Pharm[facility] the [id number] [place], [place] - [address]  Filling Pharmacy Phone: [phone]  Filling Pharmacy Fax:    Start Date: 9/26/2023

## 2023-09-26 NOTE — TELEPHONE ENCOUNTER
No further action needed. Closing encounter.     Ligia Rojas, KIRSTENN, RN   [facility] Primary Care [facility]

## 2023-09-26 NOTE — TELEPHONE ENCOUNTER
See telephone encounter 9/22/2023 for PA for Wegovy. No updates at this time.    GAMAL Haas  [facility] Primary Care Triage

## 2023-11-17 ENCOUNTER — MEDICAL CORRESPONDENCE (OUTPATIENT)
Dept: HEALTH INFORMATION MANAGEMENT | Facility: CLINIC | Age: 58
End: 2023-11-17

## 2023-11-20 ENCOUNTER — MYC MEDICAL ADVICE (OUTPATIENT)
Dept: FAMILY MEDICINE | Facility: CLINIC | Age: 58
End: 2023-11-20
Payer: COMMERCIAL

## 2023-11-22 ENCOUNTER — OFFICE VISIT (OUTPATIENT)
Dept: FAMILY MEDICINE | Facility: CLINIC | Age: 58
End: 2023-11-22
Payer: COMMERCIAL

## 2023-11-22 VITALS
HEIGHT: 63 IN | TEMPERATURE: 97.6 F | OXYGEN SATURATION: 99 % | DIASTOLIC BLOOD PRESSURE: 91 MMHG | BODY MASS INDEX: 35.08 KG/M2 | RESPIRATION RATE: 20 BRPM | WEIGHT: 198 LBS | HEART RATE: 88 BPM | SYSTOLIC BLOOD PRESSURE: 148 MMHG

## 2023-11-22 DIAGNOSIS — S39.012A LOW BACK STRAIN, INITIAL ENCOUNTER: Primary | ICD-10-CM

## 2023-11-22 PROCEDURE — 99213 OFFICE O/P EST LOW 20 MIN: CPT | Performed by: PHYSICIAN ASSISTANT

## 2023-11-22 ASSESSMENT — ENCOUNTER SYMPTOMS: BACK PAIN: 1

## 2023-11-22 ASSESSMENT — PAIN SCALES - GENERAL: PAINLEVEL: EXTREME PAIN (9)

## 2023-11-22 NOTE — LETTER
REPORT OF WORK COMP    [address] [id number] [phone]      PATIENT DATA    Employee Name: Radha De Santiago      : 1965     #: [id number]    Work related injury: Yes  Employer at time of injury: [employer]   Employer contact & phone: Contact the patient   Employed elsewhere? Yes [employer]   Workers' Compensation Carrier/Managed Care Plan:  Claim Number [id number]    Today's date: 2023  Date of injury: 23  Date of first visit: 23    PROVIDER EVALUATION: Please fill in as needed.  Please give copy to employee for employer.    1. Diagnosis: Right Low back strain     2. Treatment: Stretching, Range of Motion  3. Medication: Ibuprofen.  NOTE: When ordering a medication, [place] Rules require Work Comp or WC on prescriptions.    4. Can continue work with lifting restriction  5. Return to work date: 23    WITH RESTRICTIONS? Yes, with work restrictions: Restricted lifting - Maximum lift in pounds: 25  DURATION OF LIMITATIONS: Until 23       RESTRICTIONS: Unlimited unless listed.  Restrictions apply to home and leisure also.  If work restrictions is not available, the employee is totally disabled.    Maximum Medical Improvement (Date): Not yet   Any Permanent Partial Disability? 0%    Provider comments: Recommend home exercises and stretching.  Weight lifting restriction to allow for recovery.          Next appointment: As needed    CC: Employer, Managed Care Plan/Payor, Patient

## 2023-11-22 NOTE — PROGRESS NOTES
Meghan Garcia is a 58 year old, presenting for the following health issues:  Back Pain        11/22/2023     9:38 AM   Additional Questions   Roomed by Nghia   Accompanied by Self     She was at work at [employer] on Friday (5 days ago). 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  Later that night she woke up to go to the bathroom and standing up from the toilet she felt a sharp pain to the same area. Tried ibuprofen, muscle rub and ice.  The next morning she was stiff but able to go to work.  She notices it gets a little worse with sitting and improves with walking.      No numbness to the legs, no weakness.  No bladder weakness.  No hx of previous injury.  Sleep is fine.        History of Present Illness       Back Pain:  She presents for follow up of back pain. Patient's back pain is a new problem.    Original cause of back pain: work related injury  First noticed back pain: in the last week  Patient feels back pain: constantlyLocation of back pain:  Right lower back  Description of back pain: sharp  Back pain spreads: right buttocks    Since patient first noticed back pain, pain is: gradually worsening  Does back pain interfere with her job:  No  On a scale of 1-10 (10 being the worst), patient describes pain as:  9  What makes back pain worse: certain positions and sitting   Acupuncture: not tried  Acetaminophen: not helpful  Activity or exercise: not helpful  Chiropractor:  Not tried  Cold: helpful  Heat: not helpful  Massage: helpful  Muscle relaxants: not tried  NSAIDS: helpful  Opioids: not tried  Physical Therapy: not tried  Rest: helpful  Steroid Injection: not tried  Stretching: helpful  Surgery: not tried  TENS unit: not tried  Topical pain relievers: helpful  Other healthcare providers patient is seeing for back pain: None    She eats 0-1 servings of fruits and  "vegetables daily.She consumes 1 sweetened beverage(s) daily.She exercises with enough effort to increase her heart rate 30 to 60 minutes per day.  She exercises with enough effort to increase her heart rate 4 days per week.   She is taking medications regularly.                 Review of Systems   Musculoskeletal:  Positive for back pain.            Objective    BP (!) 148/91   Pulse 88   Temp 97.6  F (36.4  C) (Tympanic)   Resp 20   Ht 1.6 m (5' 3\")   Wt 89.8 kg (198 lb)   SpO2 99%   BMI 35.07 kg/m    Body mass index is 35.07 kg/m .  Physical Exam   GENERAL: healthy, alert and no distress  MS: no gross musculoskeletal defects noted, no edema, normal muscle tone and spine exam shows no scoliosis and ROM is normal.  Noted muscle tension nd tenderness to palp of the Rt lumbar area that coorelates w/ the pts pain.    NEURO: Normal strength and tone. SLR is negative.  Reflexes 1/4 BL patella              ASSESSMENT AND PLAN   Radha was seen today for back pain.    Diagnoses and all orders for this visit:    Low back strain, initial encounter       Recommend gentle stretching.  Continue with ibuprofen as needed.  Home stretching exercises reviewed,  Lifting restriction placed for a week.  Encouraged to connect with employer regarding work comp.  Recheck if symptoms not improving in the next week or anytime if worsening.      Akila Vance PA-C          "

## 2023-12-03 ENCOUNTER — OFFICE VISIT (OUTPATIENT)
Dept: URGENT CARE | Facility: URGENT CARE | Age: 58
End: 2023-12-03
Payer: OTHER MISCELLANEOUS

## 2023-12-03 VITALS
HEART RATE: 85 BPM | BODY MASS INDEX: 35.76 KG/M2 | TEMPERATURE: 97.5 F | RESPIRATION RATE: 16 BRPM | OXYGEN SATURATION: 100 % | WEIGHT: 201.9 LBS | SYSTOLIC BLOOD PRESSURE: 146 MMHG | DIASTOLIC BLOOD PRESSURE: 83 MMHG

## 2023-12-03 DIAGNOSIS — M54.50 ACUTE RIGHT-SIDED LOW BACK PAIN WITHOUT SCIATICA: Primary | ICD-10-CM

## 2023-12-03 PROCEDURE — 99214 OFFICE O/P EST MOD 30 MIN: CPT | Performed by: INTERNAL MEDICINE

## 2023-12-03 RX ORDER — CYCLOBENZAPRINE HCL 5 MG
2.5-5 TABLET ORAL 3 TIMES DAILY PRN
Qty: 12 TABLET | Refills: 0 | Status: SHIPPED | OUTPATIENT
Start: 2023-12-03 | End: 2023-12-08

## 2023-12-03 RX ORDER — METHYLPREDNISOLONE 4 MG
TABLET, DOSE PACK ORAL
Qty: 21 TABLET | Refills: 0 | Status: SHIPPED | OUTPATIENT
Start: 2023-12-03 | End: 2024-01-29

## 2023-12-03 RX ORDER — NAPROXEN 500 MG/1
500 TABLET ORAL 2 TIMES DAILY WITH MEALS
Qty: 28 TABLET | Refills: 0 | Status: SHIPPED | OUTPATIENT
Start: 2023-12-03 | End: 2023-12-08

## 2023-12-03 ASSESSMENT — PAIN SCALES - GENERAL: PAINLEVEL: WORST PAIN (10)

## 2023-12-03 NOTE — PROGRESS NOTES
ASSESSMENT AND PLAN:      ICD-10-CM    1. Acute right-sided low back pain without sciatica  M54.50 Physical Therapy Referral     methylPREDNISolone (MEDROL DOSEPAK) 4 MG tablet therapy pack     cyclobenzaprine (FLEXERIL) 5 MG tablet     naproxen (NAPROSYN) 500 MG tablet          PLAN:  Back Pain: ice, heat, rest, Physical Therapy, and Rx:  NOTED  RECHECK 1 WEEK        Katarzyna Tolentino MD  [facility] URGENT [facility]ARE    Subjective     Radha De Santiago is a 58 year old who presents for Patient presents with:  Back Pain: Right lower back pain.     an established patient of [facility].    Back Pain    Onset of symptoms was 2 week(s) ago.  Location: right low back  Radiation: radiates into the right back of thigh/leg  Context:       The injury happened while at work  Was transferring patient at [facility]      Seen here at clinic 11/22 with this plan below- finished the ibuprofen medication.  Ice packing.  She had some time off of work.    Radha was seen today for back pain.  Diagnoses and all orders for this visit:  Low back strain, initial encounter  Recommend gentle stretching.  Continue with ibuprofen as needed.  Home stretching exercises reviewed,  Lifting restriction placed for a week.  Encouraged to connect with employer regarding work comp.  Recheck if symptoms not improving in the next week or anytime if worsening.     Akila Vance PA-C    Current and Associated symptoms: pain  Denies: fecal incontinence, urinary incontinence, lower extremity numbness, lower extremity weakness, and paresthesia    Aggravating Factors: lifting, standing, walking, and changing position  Therapies to improve symptoms include: ice and ibuprofen  Past history: no prior back problems      Review of Systems        Objective    BP (!) 146/83 (BP Location: Left arm, Patient Position: Standing, Cuff Size: Adult Large)   Pulse 85   Temp 97.5  F (36.4  C) (Tympanic)   Resp 16   Wt 91.6 kg (201 lb 14.4 oz)    SpO2 100%   BMI 35.76 kg/m    Physical Exam  Vitals reviewed.   Constitutional:       Appearance: Normal appearance.   Musculoskeletal:      Comments: BACK: Lumbosacral spine area reveals local tenderness right lumbar spine muscle area & buttock.  Painful and reduced LS ROM noted. Straight leg raise is negative at 45 degrees on bilateral .  motor strength and sensation normal, including heel and toe strength       Neurological:      Mental Status: She is alert.

## 2023-12-03 NOTE — LETTER
REPORT OF WORK COMP    [address] [address]      PATIENT DATA  Employee Name: Radha De Santiago      : 1965     #: [id number]  Work related injury: Yes  Employer at time of injury: [employer]  Employer contact & phone:   Employed elsewhere?   Workers' Compensation Carrier/Managed Care Plan:      Today's date: 12/3/2023  Date of injury: PER PATIENT 5 DAYS PRIOR TO FIRST VISIT  Date of first visit: 2023    PROVIDER EVALUATION: Please fill in as needed.  Please give copy to employee for employer.    1. Diagnosis:   Encounter Diagnosis   Name Primary?    Acute right-sided low back pain without sciatica Yes        2. Treatment: Physical Therapy .  3. Medication:   NAPROSYN, FLEXERIL, MEDOL PACK  NOTE: When ordering a medication, [place] Rules require Work Comp or WC on prescriptions.    4. No work from 12/3 to .  5. Return to work date: 2023   ** WITH RESTRICTIONS?   NEEDS RECHECK WITH PRIMARY CLINIC FOR RESTRICTIONS  RESTRICTIONS: Unlimited unless listed.  Restrictions apply to home and leisure also.  If work restrictions is not available, the employee is totally disabled  Maximum Medical Improvement (Date): DEFERRED  Any Permanent Partial Disability? Deferred to future exam/consult.    Medical Examiner: Katarzyna Tolentino MD    [id number]

## 2023-12-05 ENCOUNTER — THERAPY VISIT (OUTPATIENT)
Dept: PHYSICAL THERAPY | Facility: CLINIC | Age: 58
End: 2023-12-05
Attending: INTERNAL MEDICINE
Payer: OTHER MISCELLANEOUS

## 2023-12-05 DIAGNOSIS — M54.50 ACUTE RIGHT-SIDED LOW BACK PAIN WITHOUT SCIATICA: Primary | ICD-10-CM

## 2023-12-05 PROCEDURE — 97110 THERAPEUTIC EXERCISES: CPT | Mod: GP | Performed by: PHYSICAL THERAPIST

## 2023-12-05 PROCEDURE — 97140 MANUAL THERAPY 1/> REGIONS: CPT | Mod: GP | Performed by: PHYSICAL THERAPIST

## 2023-12-05 PROCEDURE — 97161 PT EVAL LOW COMPLEX 20 MIN: CPT | Mod: GP | Performed by: PHYSICAL THERAPIST

## 2023-12-05 NOTE — PROGRESS NOTES
PHYSICAL THERAPY EVALUATION  Type of Visit: Evaluation    See electronic medical record for Abuse and Falls Screening details.    Subjective Radha was assisting three other people while lifting a patient nd felt something pop in her right LB. DOI 2023. She is a nurse at [employer]. Pain increased that night. She started taking Ibuprofen, using heat/ice with min. Relief.  She sent to   last week and is now off of work.  She notes Right LB/glue pain increases with sitting.  Standing and walking relieve the pain. She is currently taking Naproxyn, flexiril, medrol dose pack with good relief of sx.  Sleeping better now.  MD 23      Presenting condition or subjective complaint:  right LBP  Date of onset: 23    Relevant medical history:     Dates & types of surgery:      Prior diagnostic imaging/testing results:       Prior therapy history for the same diagnosis, illness or injury:        Prior Level of FunctionTransfers:   Ambulation:   ADL:   IADL:     Living Environment  Social support:     Type of home:     Stairs to enter the home:         Ramp:     Stairs inside the home:         Help at home:    Equipment owned:       Employment:      Hobbies/Interests:      Patient goals for therapy:      Pain assessment: Pain present  Location: right LBP/Ratin/10     Objective   LUMBAR SPINE EVALUATION  PAIN: Pain Level at Rest: 3/10  Pain Level with Use: 5/10  Pain Location: lumbar spine and hip  Pain Quality: Sharp  Pain Frequency: intermittent or daily  Pain is Worst: daytime  Pain is Exacerbated By: sitting,   Pain is Relieved By: cold, heat, NSAIDs, and walking  Pain Progression: Improved  INTEGUMENTARY (edema, incisions):   POSTURE: WNL  GAIT:   Weightbearing Status:   Assistive Device(s):   Gait Deviations:   BALANCE/PROPRIOCEPTION: WNL  WEIGHTBEARING ALIGNMENT: WNL  NON-WEIGHTBEARING ALIGNMENT:    ROM:  lumbar flexion Wnl, tightness, ext wnl, -, SB R - wnl, +, SB L wnly, slight +, Rot wnl.  Hip ROM  WNL.   All movements slow and guarded today.   PELVIC/SI SCREEN:   STRENGTH:  hip flex 4/5, ext, 4/5, core fair, hip abd 4/5 david    MYOTOMES:   DTR S:   CORD SIGNS:   DERMATOMES:   NEURAL TENSION:  slump + on L, - on R, SLR - david  FLEXIBILITY:   LUMBAR/HIP Special Tests:    PELVIS/SI SPECIAL TESTS:   FUNCTIONAL TESTS: Double Leg Squat: Anterior knee translation, Knee valgus, Hip internal rotation, and Improper use of glutes/hips  PALPATION:  Pain palbation lumbar extensor mm on R, R glute/piriformis mm  SPINAL SEGMENTAL CONCLUSIONS:       Assessment & Plan   CLINICAL IMPRESSIONS  Medical Diagnosis: acute right sided LBP without sciatica    Treatment Diagnosis: Right LBP   Impression/Assessment: Patient is a 58 year old female with right LB/glute complaints.  The following significant findings have been identified: Pain, Decreased ROM/flexibility, and Decreased strength. These impairments interfere with their ability to perform work tasks, recreational activities, household chores, driving , household mobility, and community mobility as compared to previous level of function.     Clinical Decision Making (Complexity):  Clinical Presentation: Stable/Uncomplicated  Clinical Presentation Rationale: based on medical and personal factors listed in PT evaluation  Clinical Decision Making (Complexity): Low complexity    PLAN OF CARE  Treatment Interventions:  Interventions: Manual Therapy, Neuromuscular Re-education, Therapeutic Activity, Therapeutic Exercise, Self-Care/Home Management    Long Term Goals     PT Goal 1  Goal Identifier: sitting  Goal Description: Pt will tolerate 30-60 min of sitting with PL 2/10  Rationale: to maximize safety and independence with performance of ADLs and functional tasks;to maximize safety and independence within the home;to maximize safety and independence within the community;to maximize safety and independence with transportation;to maximize safety and independence with self  cares  Target Date: 02/06/24      Frequency of Treatment: 2x/month x 2 months  Duration of Treatment: 2 months    Recommended Referrals to Other Professionals: Physical Therapy  Education Assessment:   Learner/Method: Patient;Demonstration;Pictures/Video    Risks and benefits of evaluation/treatment have been explained.   Patient/Family/caregiver agrees with Plan of Care.     Evaluation Time:     PT Eval, Low Complexity Minutes (88144): 15       Signing Clinician: Rosemary Mckeon PT

## 2023-12-08 ENCOUNTER — VIRTUAL VISIT (OUTPATIENT)
Dept: FAMILY MEDICINE | Facility: CLINIC | Age: 58
End: 2023-12-08
Payer: COMMERCIAL

## 2023-12-08 ENCOUNTER — E-VISIT (OUTPATIENT)
Dept: FAMILY MEDICINE | Facility: CLINIC | Age: 58
End: 2023-12-08
Payer: COMMERCIAL

## 2023-12-08 DIAGNOSIS — M54.50 ACUTE RIGHT-SIDED LOW BACK PAIN WITHOUT SCIATICA: ICD-10-CM

## 2023-12-08 DIAGNOSIS — I10 ESSENTIAL HYPERTENSION WITH GOAL BLOOD PRESSURE LESS THAN 130/85: Primary | ICD-10-CM

## 2023-12-08 PROCEDURE — 99421 OL DIG E/M SVC 5-10 MIN: CPT | Performed by: NURSE PRACTITIONER

## 2023-12-08 PROCEDURE — 99213 OFFICE O/P EST LOW 20 MIN: CPT | Mod: VID | Performed by: NURSE PRACTITIONER

## 2023-12-08 RX ORDER — LISINOPRIL 10 MG/1
10 TABLET ORAL DAILY
Qty: 30 TABLET | Refills: 1 | Status: SHIPPED | OUTPATIENT
Start: 2023-12-08 | End: 2023-12-18 | Stop reason: SINTOL

## 2023-12-08 RX ORDER — CYCLOBENZAPRINE HCL 5 MG
5 TABLET ORAL 3 TIMES DAILY PRN
Qty: 60 TABLET | Refills: 1 | Status: SHIPPED | OUTPATIENT
Start: 2023-12-08 | End: 2024-03-11

## 2023-12-08 RX ORDER — NAPROXEN 500 MG/1
500 TABLET ORAL 2 TIMES DAILY WITH MEALS
Qty: 60 TABLET | Refills: 0 | Status: SHIPPED | OUTPATIENT
Start: 2023-12-08 | End: 2024-02-14

## 2023-12-08 NOTE — LETTER
December 8, 2023      Radha De Santiago  [address] [facility] [address]        To Whom It May Concern:    Radha De Santiago was seen in our clinic. She may return to work with the following: limited to light duty - lifting no greater than 20 pounds on or about 12/12/2023 to 12/19/2023 and then able to work without restrictions.      Sincerely,      Denise Douglas, NP, APRN CNP

## 2023-12-08 NOTE — TELEPHONE ENCOUNTER
Provider E-Visit time total (minutes): 7  BP Readings from Last 6 Encounters:   12/03/23 (!) 146/83   11/22/23 (!) 148/91   07/20/23 (!) 142/88   02/25/22 (!) 142/82

## 2023-12-08 NOTE — PROGRESS NOTES
"    Instructions Relayed to Patient by Virtual Roomer:     Patient is active on [employer]:   Relayed following to patient: \"It looks like you are active on [employer], are you able to join the visit this way? If not, do you need us to send you a link now or would you like your provider to send a link via text or email when they are ready to initiate the visit?\"    Reminded patient to ensure they were logged on to virtual visit by arrival time listed. Documented in appointment notes if patient had flexibility to initiate visit sooner than arrival time. If pediatric virtual visit, ensured pediatric patient along with parent/guardian will be present for video visit.     Patient offered the website [email or web address] and/or phone number to [employer] Help line: [phone]   Radha is a 58 year old who is being evaluated via a billable video visit.      How would you like to obtain your AVS? Mail a copy - Patient wants it sent to both - mail and my chart  If the video visit is dropped, the invitation should be resent by: Text to cell phone: [phone]  Will anyone else be joining your video visit? No        Subjective   Radha is a 58 year old, presenting for the following health issues:  Work Comp and Back Pain        12/8/2023    12:38 PM   Additional Questions   Roomed by Thuy       Patient wants to make sure that its WC and not insurance.    History of Present Illness       Reason for visit:  Follow up- WC for back    She eats 2-3 servings of fruits and vegetables daily.She consumes 0 sweetened beverage(s) daily.She exercises with enough effort to increase her heart rate 30 to 60 minutes per day.  She exercises with enough effort to increase her heart rate 3 or less days per week.   She is taking medications regularly.       Chronic/Recurring Back Pain Follow Up    Where is your back pain located? (Select all that apply) low back right  How would you describe your back pain?  dull ache pain " is 5/10  Where does your back pain spread? the right buttock  Since your last clinic visit for back pain, how has your pain changed? gradually improving  Does your back pain interfere with your job? YES- still off of work return on 12/12  Since your last visit, have you tried any new treatment? 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  Later that night she woke up to go to the bathroom and standing up from the toilet she felt a sharp pain to the same area. Tried ibuprofen, muscle rub and ice.  The next morning she was stiff but able to go to work.  She notices it gets a little worse with sitting and improves with walking.     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     Review of Systems   Constitutional, HEENT, cardiovascular, pulmonary, gi and gu systems are negative, except as otherwise noted.      Objective           Vitals:  No vitals were obtained today due to virtual visit.    Physical Exam   GENERAL: Healthy, alert and no distress  EYES: Eyes grossly normal to inspection.  No discharge or erythema, or obvious scleral/conjunctival abnormalities.  RESP: No audible wheeze, cough, or visible cyanosis.  No visible retractions or increased work of breathing.    MS: No gross musculoskeletal defects noted.  Normal range of motion.  No visible edema.  SKIN: Visible skin clear. No significant rash, abnormal pigmentation or lesions.  NEURO: mentation intact  PSYCH: Mentation appears normal, affect  normal/bright, judgement and insight intact, normal speech and appearance well-groomed.      Assessment & Plan     Acute right-sided low back pain without sciatica  Symptomatic therapy suggested:   apply heat to affected area, apply ice to affected area, prescription for muscle relaxant, prescription for NSAID given, referral to Physical Therapy  - naproxen (NAPROSYN) 500 MG tablet  Dispense: 60 tablet; Refill: 0  - cyclobenzaprine (FLEXERIL) 5 MG tablet  Dispense: 60 tablet; Refill: 1      Ordering of each unique test  Prescription drug management   Time spent by me doing chart review, history and exam, documentation and further activities per the note       See Patient Instructions  Patient Instructions   PLAN:   1.   Symptomatic therapy suggested:   apply heat to affected area, apply ice to affected area, prescription for muscle relaxant, prescription for NSAID given, referral to Physical Therapy    2.  Orders Placed This Encounter   Medications    naproxen (NAPROSYN) 500 MG tablet     Sig: Take 1 tablet (500 mg) by mouth 2 times daily (with meals)     Dispense:  60 tablet     Refill:  0    cyclobenzaprine (FLEXERIL) 5 MG tablet     Sig: Take 1 tablet (5 mg) by mouth 3 times daily as needed for muscle spasms     Dispense:  60 tablet     Refill:  1     3. FOLLOW UP WITH SPECIALIST :physical therapy as planned   Will OK going back to work next week with light duty for a week and then will release restrictions after that    Patient needs to follow up in if no improvement,or sooner if worsening of symptoms or other symptoms develop.          VITALY Caldwell [facility]          Video-Visit Details    Type of service:  Video Visit     Originating Location (pt. Location): Home    Distant Location (provider location):  On-site  Platform used for Video Visit: Shani

## 2023-12-08 NOTE — PATIENT INSTRUCTIONS
PLAN:   1.   Symptomatic therapy suggested:   apply heat to affected area, apply ice to affected area, prescription for muscle relaxant, prescription for NSAID given, referral to Physical Therapy    2.  Orders Placed This Encounter   Medications    naproxen (NAPROSYN) 500 MG tablet     Sig: Take 1 tablet (500 mg) by mouth 2 times daily (with meals)     Dispense:  60 tablet     Refill:  0    cyclobenzaprine (FLEXERIL) 5 MG tablet     Sig: Take 1 tablet (5 mg) by mouth 3 times daily as needed for muscle spasms     Dispense:  60 tablet     Refill:  1     3. FOLLOW UP WITH SPECIALIST :physical therapy as planned   Will OK going back to work next week with light duty for a week and then will release restrictions after that    Patient needs to follow up in if no improvement,or sooner if worsening of symptoms or other symptoms develop.

## 2023-12-17 ENCOUNTER — E-VISIT (OUTPATIENT)
Dept: FAMILY MEDICINE | Facility: CLINIC | Age: 58
End: 2023-12-17
Payer: COMMERCIAL

## 2023-12-17 DIAGNOSIS — I10 ESSENTIAL HYPERTENSION WITH GOAL BLOOD PRESSURE LESS THAN 130/85: ICD-10-CM

## 2023-12-17 PROCEDURE — 99421 OL DIG E/M SVC 5-10 MIN: CPT | Performed by: NURSE PRACTITIONER

## 2023-12-18 ENCOUNTER — THERAPY VISIT (OUTPATIENT)
Dept: PHYSICAL THERAPY | Facility: CLINIC | Age: 58
End: 2023-12-18
Payer: OTHER MISCELLANEOUS

## 2023-12-18 DIAGNOSIS — M54.50 ACUTE RIGHT-SIDED LOW BACK PAIN WITHOUT SCIATICA: Primary | ICD-10-CM

## 2023-12-18 PROCEDURE — 97530 THERAPEUTIC ACTIVITIES: CPT | Mod: GP | Performed by: PHYSICAL THERAPIST

## 2023-12-18 PROCEDURE — 97110 THERAPEUTIC EXERCISES: CPT | Mod: 59 | Performed by: PHYSICAL THERAPIST

## 2023-12-18 RX ORDER — LOSARTAN POTASSIUM 25 MG/1
25 TABLET ORAL DAILY
Qty: 30 TABLET | Refills: 1 | Status: SHIPPED | OUTPATIENT
Start: 2023-12-18 | End: 2024-01-29

## 2023-12-18 NOTE — TELEPHONE ENCOUNTER
Provider E-Visit time total (minutes): 7      BP Readings from Last 3 Encounters:   12/03/23 (!) 146/83   11/22/23 (!) 148/91   07/20/23 (!) 142/88

## 2024-01-18 ENCOUNTER — TELEPHONE (OUTPATIENT)
Dept: FAMILY MEDICINE | Facility: CLINIC | Age: 59
End: 2024-01-18
Payer: COMMERCIAL

## 2024-01-18 NOTE — TELEPHONE ENCOUNTER
Patient Quality Outreach    Patient is due for the following:   Hypertension -  BP check    Next Steps:   Schedule a nurse only visit for hypertension    Type of outreach:    Sent Xtract message.    Next Steps:  Reach out within 90 days via [employer].    Max number of attempts reached: No. Will try again in 90 days if patient still on fail list.    Questions for provider review:    None           Diane L. Schoenherr, RN

## 2024-01-29 ENCOUNTER — E-VISIT (OUTPATIENT)
Dept: FAMILY MEDICINE | Facility: CLINIC | Age: 59
End: 2024-01-29
Payer: COMMERCIAL

## 2024-01-29 DIAGNOSIS — I10 ESSENTIAL HYPERTENSION WITH GOAL BLOOD PRESSURE LESS THAN 130/85: ICD-10-CM

## 2024-01-29 PROCEDURE — 99421 OL DIG E/M SVC 5-10 MIN: CPT | Performed by: NURSE PRACTITIONER

## 2024-01-29 RX ORDER — LOSARTAN POTASSIUM 50 MG/1
50 TABLET ORAL DAILY
Qty: 60 TABLET | Refills: 1 | Status: SHIPPED | OUTPATIENT
Start: 2024-01-29 | End: 2024-03-11

## 2024-01-30 NOTE — TELEPHONE ENCOUNTER
Please help schedule in person in about a month   OK to use same day slot       Provider E-Visit time total (minutes): 7  BP Readings from Last 6 Encounters:   12/03/23 (!) 146/83   11/22/23 (!) 148/91   07/20/23 (!) 142/88   02/25/22 (!) 142/82

## 2024-02-14 DIAGNOSIS — M54.50 ACUTE RIGHT-SIDED LOW BACK PAIN WITHOUT SCIATICA: ICD-10-CM

## 2024-02-14 RX ORDER — NAPROXEN 500 MG/1
500 TABLET ORAL 2 TIMES DAILY WITH MEALS
Qty: 60 TABLET | Refills: 0 | Status: SHIPPED | OUTPATIENT
Start: 2024-02-14 | End: 2024-03-11

## 2024-03-11 ENCOUNTER — OFFICE VISIT (OUTPATIENT)
Dept: FAMILY MEDICINE | Facility: CLINIC | Age: 59
End: 2024-03-11
Payer: OTHER MISCELLANEOUS

## 2024-03-11 ENCOUNTER — OFFICE VISIT (OUTPATIENT)
Dept: FAMILY MEDICINE | Facility: CLINIC | Age: 59
End: 2024-03-11
Payer: COMMERCIAL

## 2024-03-11 ENCOUNTER — ANCILLARY PROCEDURE (OUTPATIENT)
Dept: GENERAL RADIOLOGY | Facility: CLINIC | Age: 59
End: 2024-03-11
Attending: NURSE PRACTITIONER
Payer: COMMERCIAL

## 2024-03-11 VITALS
BODY MASS INDEX: 34.75 KG/M2 | TEMPERATURE: 98.3 F | HEIGHT: 65 IN | HEART RATE: 87 BPM | DIASTOLIC BLOOD PRESSURE: 85 MMHG | SYSTOLIC BLOOD PRESSURE: 137 MMHG | WEIGHT: 208.6 LBS | OXYGEN SATURATION: 97 % | RESPIRATION RATE: 18 BRPM

## 2024-03-11 DIAGNOSIS — M54.41 ACUTE RIGHT-SIDED LOW BACK PAIN WITH RIGHT-SIDED SCIATICA: Primary | ICD-10-CM

## 2024-03-11 DIAGNOSIS — S39.012A LOW BACK STRAIN, INITIAL ENCOUNTER: ICD-10-CM

## 2024-03-11 DIAGNOSIS — M54.41 ACUTE RIGHT-SIDED LOW BACK PAIN WITH RIGHT-SIDED SCIATICA: ICD-10-CM

## 2024-03-11 DIAGNOSIS — I10 ESSENTIAL HYPERTENSION WITH GOAL BLOOD PRESSURE LESS THAN 130/85: Primary | ICD-10-CM

## 2024-03-11 DIAGNOSIS — Z13.29 SCREENING FOR THYROID DISORDER: ICD-10-CM

## 2024-03-11 DIAGNOSIS — Z13.1 SCREENING FOR DIABETES MELLITUS (DM): ICD-10-CM

## 2024-03-11 DIAGNOSIS — E66.811 CLASS 1 OBESITY DUE TO EXCESS CALORIES WITH SERIOUS COMORBIDITY AND BODY MASS INDEX (BMI) OF 33.0 TO 33.9 IN ADULT: ICD-10-CM

## 2024-03-11 DIAGNOSIS — E66.09 CLASS 1 OBESITY DUE TO EXCESS CALORIES WITH SERIOUS COMORBIDITY AND BODY MASS INDEX (BMI) OF 33.0 TO 33.9 IN ADULT: ICD-10-CM

## 2024-03-11 DIAGNOSIS — Z13.6 CARDIOVASCULAR SCREENING; LDL GOAL LESS THAN 130: ICD-10-CM

## 2024-03-11 PROCEDURE — 99214 OFFICE O/P EST MOD 30 MIN: CPT | Performed by: NURSE PRACTITIONER

## 2024-03-11 PROCEDURE — 99213 OFFICE O/P EST LOW 20 MIN: CPT | Performed by: NURSE PRACTITIONER

## 2024-03-11 PROCEDURE — 72100 X-RAY EXAM L-S SPINE 2/3 VWS: CPT | Mod: TC | Performed by: RADIOLOGY

## 2024-03-11 RX ORDER — CYCLOBENZAPRINE HCL 5 MG
5 TABLET ORAL 3 TIMES DAILY PRN
Qty: 60 TABLET | Refills: 1 | Status: SHIPPED | OUTPATIENT
Start: 2024-03-11 | End: 2024-07-15

## 2024-03-11 RX ORDER — LOSARTAN POTASSIUM 100 MG/1
100 TABLET ORAL DAILY
Qty: 90 TABLET | Refills: 3 | Status: SHIPPED | OUTPATIENT
Start: 2024-03-11 | End: 2024-08-08

## 2024-03-11 RX ORDER — NAPROXEN 500 MG/1
500 TABLET ORAL 2 TIMES DAILY WITH MEALS
Qty: 60 TABLET | Refills: 0 | Status: SHIPPED | OUTPATIENT
Start: 2024-03-11 | End: 2024-04-08

## 2024-03-11 ASSESSMENT — PAIN SCALES - GENERAL: PAINLEVEL: SEVERE PAIN (6)

## 2024-03-11 NOTE — PATIENT INSTRUCTIONS
PLAN:   1.   Symptomatic therapy suggested: call prn if symptoms persist or worsen.  prescription for muscle relaxant, prescription for NSAID given, referral to Physical Therapy    2.   Orders Placed This Encounter   Procedures    XR Lumbar Spine 2/3 Views       3. Continue with PT   Lumbar spine xray consider MRI   Will follow up and/or notify patient of  results via My Chart to determine further need for followup   Patient needs to follow up in if no improvement,or sooner if worsening of symptoms or other symptoms develop.

## 2024-03-11 NOTE — PATIENT INSTRUCTIONS
PLAN:   1.   Symptomatic therapy suggested: will increase losartan to 100 mg.  2.  Orders Placed This Encounter   Medications    losartan (COZAAR) 100 MG tablet     Sig: Take 1 tablet (100 mg) by mouth daily     Dispense:  90 tablet     Refill:  3     Orders Placed This Encounter   Procedures    Lipid panel reflex to direct LDL Fasting    Albumin Random Urine Quantitative with Creat Ratio    Comprehensive metabolic panel    TSH with free T4 reflex       3.  FUTURE LABS:       - Schedule a fasting blood draw before physical   Schedule PE in July or August    Patient needs to follow up in if no improvement,or sooner if worsening of symptoms or other symptoms develop.

## 2024-03-11 NOTE — PROGRESS NOTES
Meghan Garcia is a 58 year old, presenting for the following health issues:  Hypertension (Follow up on hypertension)        3/11/2024     9:14 AM   Additional Questions   Roomed by Tiffanie VENCES   Accompanied by Self         3/11/2024     9:14 AM   Patient Reported Additional Medications   Patient reports taking the following new medications None     History of Present Illness       Hypertension: She presents for follow up of hypertension.  She does check blood pressure  regularly outside of the clinic. Outside blood pressures have been over 140/90. She follows a low salt diet.     She eats 2-3 servings of fruits and vegetables daily.She consumes 0 sweetened beverage(s) daily.She exercises with enough effort to increase her heart rate 60 or more minutes per day.  She exercises with enough effort to increase her heart rate 4 days per week.   She is taking medications regularly.         Hypertension Follow-up    Do you check your blood pressure regularly outside of the clinic? Yes   Are you following a low salt diet? Yes  Are your blood pressures ever more than 140 on the top number (systolic) OR more   than 90 on the bottom number (diastolic), for example 140/90? Yes    Labs reviewed in EPIC  BP Readings from Last 3 Encounters:   03/11/24 137/85   12/03/23 (!) 146/83   11/22/23 (!) 148/91    Wt Readings from Last 3 Encounters:   03/11/24 94.6 kg (208 lb 9.6 oz)   12/03/23 91.6 kg (201 lb 14.4 oz)   11/22/23 89.8 kg (198 lb)                  Patient Active Problem List   Diagnosis    Chondromalacia of patella    Intramural leiomyoma of uterus    Vitamin D deficiency    Acute right-sided low back pain without sciatica     Past Surgical History:   Procedure Laterality Date    HRW HB PF ASPIR/INJ GANGLION CYST Left     wrist       Social History     Tobacco Use    Smoking status: Never    Smokeless tobacco: Never   Substance Use Topics    Alcohol use: Not Currently     Family History   Problem Relation Age of  "Onset    Hypertension Mother     Thyroid Disease Mother     Coronary Artery Disease Father     Breast Cancer Maternal Aunt     Ovarian Cancer Paternal Aunt     Diabetes No family hx of     Hyperlipidemia No family hx of     Kidney Disease No family hx of     Cerebrovascular Disease No family hx of     Depression No family hx of     Mental Illness No family hx of     Uterine Cancer No family hx of     Prostate Cancer No family hx of     Colorectal Cancer No family hx of     Pancreatic Cancer No family hx of     Lung Cancer No family hx of     Melanoma No family hx of     Autoimmune Disease No family hx of          Current Outpatient Medications   Medication Sig Dispense Refill    losartan (COZAAR) 100 MG tablet Take 1 tablet (100 mg) by mouth daily 90 tablet 3    cyclobenzaprine (FLEXERIL) 5 MG tablet Take 1 tablet (5 mg) by mouth 3 times daily as needed for muscle spasms 60 tablet 1    naproxen (NAPROSYN) 500 MG tablet Take 1 tablet (500 mg) by mouth 2 times daily (with meals) 60 tablet 0    Semaglutide-Weight Management (WEGOVY) 0.25 MG/0.5ML pen Inject 0.25 mg Subcutaneous once a week 2 mL 0    Vitamin D, Cholecalciferol, 25 MCG (1000 UT) CAPS        Allergies   Allergen Reactions    Calcitonin (Eitzen) Anaphylaxis    Pyrimethamine Anaphylaxis     Pt gets diffuse swelling  (metakelfin)    Other Environmental Allergy      PN: LW Reaction: metacalfin for malaria in [facility]    Acetaminophen Rash       Review of Systems  Constitutional, HEENT, cardiovascular, pulmonary, gi and gu systems are negative, except as otherwise noted.      Objective    /85 (BP Location: Right arm, Patient Position: Sitting, Cuff Size: Adult Large)   Pulse 87   Temp 98.3  F (36.8  C) (Temporal)   Resp 18   Ht 1.645 m (5' 4.75\")   Wt 94.6 kg (208 lb 9.6 oz)   SpO2 97%   BMI 34.98 kg/m    Body mass index is 34.98 kg/m .  Physical Exam   GENERAL: Patient is well nourished, well developed,in no apparent distress, non-toxic, in no " respiratory distress and acyanotic, alert, cooperative, and well hydrated  EYES: Eyes grossly normal to inspection and conjunctivae and sclerae normal  HENT:ear canals and TM's normal and nose and mouth without ulcers or lesions   NECK:normal and supple  CARDIAC:regular rates and rhythm, no murmur, click or rub, and no irregular beats  without LE edema bilaterally  RESP: normal respiratory rate and rhythm, lungs clear to auscultation  unlabored respirations, no intercostal retractions or accessory muscle use  ABD:soft, nontender  SKIN: Skin color, texture, turgor normal. No rashes or lesions.  MS: extremities normal- no gross deformities noted, gait normal, and normal muscle tone  NEURO: Normal strength and tone, sensory exam grossly normal, mentation intact, and speech normal  PSYCH: Alert, oriented, thought content appropriate,mentation appears normal., affect and mood normal        Office Visit on 07/20/2023   Component Date Value Ref Range Status    Cholesterol 07/20/2023 218 (H)  <200 mg/dL Final    Triglycerides 07/20/2023 61  <150 mg/dL Final    Direct Measure HDL 07/20/2023 81  >=50 mg/dL Final    LDL Cholesterol Calculated 07/20/2023 125 (H)  <=100 mg/dL Final    Non HDL Cholesterol 07/20/2023 137 (H)  <130 mg/dL Final    WBC Count 07/20/2023 4.0  4.0 - 11.0 10e3/uL Final    RBC Count 07/20/2023 4.86  3.80 - 5.20 10e6/uL Final    Hemoglobin 07/20/2023 14.4  11.7 - 15.7 g/dL Final    Hematocrit 07/20/2023 44.0  35.0 - 47.0 % Final    MCV 07/20/2023 91  78 - 100 fL Final    MCH 07/20/2023 29.6  26.5 - 33.0 pg Final    MCHC 07/20/2023 32.7  31.5 - 36.5 g/dL Final    RDW 07/20/2023 12.5  10.0 - 15.0 % Final    Platelet Count 07/20/2023 337  150 - 450 10e3/uL Final    Sodium 07/20/2023 143  136 - 145 mmol/L Final    Potassium 07/20/2023 3.9  3.4 - 5.3 mmol/L Final    Chloride 07/20/2023 107  98 - 107 mmol/L Final    Carbon Dioxide (CO2) 07/20/2023 27  22 - 29 mmol/L Final    Anion Gap 07/20/2023 9  7 - 15 mmol/L  Final    Urea Nitrogen 07/20/2023 10.4  6.0 - 20.0 mg/dL Final    Creatinine 07/20/2023 0.92  0.51 - 0.95 mg/dL Final    Calcium 07/20/2023 9.5  8.6 - 10.0 mg/dL Final    Glucose 07/20/2023 101 (H)  70 - 99 mg/dL Final    Alkaline Phosphatase 07/20/2023 63  35 - 104 U/L Final    AST 07/20/2023 25  0 - 45 U/L Final    Reference intervals for this test were updated on 6/12/2023 to more accurately reflect our healthy population. There may be differences in the flagging of prior results with similar values performed with this method. Interpretation of those prior results can be made in the context of the updated reference intervals.    ALT 07/20/2023 11  0 - 50 U/L Final    Reference intervals for this test were updated on 6/12/2023 to more accurately reflect our healthy population. There may be differences in the flagging of prior results with similar values performed with this method. Interpretation of those prior results can be made in the context of the updated reference intervals.      Protein Total 07/20/2023 6.8  6.4 - 8.3 g/dL Final    Albumin 07/20/2023 4.3  3.5 - 5.2 g/dL Final    Bilirubin Total 07/20/2023 1.2  <=1.2 mg/dL Final    GFR Estimate 07/20/2023 72  >60 mL/min/1.73m2 Final    TSH 07/20/2023 0.97  0.30 - 4.20 uIU/mL Final   Assessment & Plan     Essential hypertension with goal blood pressure less than 130/85  HTN Plan:  1)  Medication: dosage change: increase losartan to 100 mg   2)  Dietary sodium restriction  3)  Regular aerobic exercise  4)  Recheck in 6 months, sooner should new symptoms or   problems arise.  5) See todays orders.    Patient Education: Reviewed risks of hypertension and principles of   treatment.  - Albumin Random Urine Quantitative with Creat Ratio  - Comprehensive metabolic panel  - losartan (COZAAR) 100 MG tablet  Dispense: 90 tablet; Refill: 3    Class 1 obesity due to excess calories with serious comorbidity and body mass index (BMI) of 33.0 to 33.9 in adult  - TSH with  "free T4 reflex    CARDIOVASCULAR SCREENING; LDL GOAL LESS THAN 130  - Lipid panel reflex to direct LDL Fasting    Screening for diabetes mellitus (DM)  - Comprehensive metabolic panel    Screening for thyroid disorder  - TSH with free T4 reflex      Ordering of each unique test  Prescription drug management   Time spent by me doing chart review, history and exam, documentation and further activities per the note      BMI  Estimated body mass index is 34.98 kg/m  as calculated from the following:    Height as of this encounter: 1.645 m (5' 4.75\").    Weight as of this encounter: 94.6 kg (208 lb 9.6 oz).   Weight management plan: Discussed healthy diet and exercise guidelines      See Patient Instructions  Patient Instructions   PLAN:   1.   Symptomatic therapy suggested: will increase losartan to 100 mg.  2.  Orders Placed This Encounter   Medications    losartan (COZAAR) 100 MG tablet     Sig: Take 1 tablet (100 mg) by mouth daily     Dispense:  90 tablet     Refill:  3     Orders Placed This Encounter   Procedures    Lipid panel reflex to direct LDL Fasting    Albumin Random Urine Quantitative with Creat Ratio    Comprehensive metabolic panel    TSH with free T4 reflex       3.  FUTURE LABS:       - Schedule a fasting blood draw before physical   Schedule PE in July or August    Patient needs to follow up in if no improvement,or sooner if worsening of symptoms or other symptoms develop.              Signed Electronically by: VITALY Caldwell CNP    "

## 2024-03-11 NOTE — PROGRESS NOTES
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 and felt a pull in her right lower back   Has had 3 sessions of physical therapy   and is still having pain   Is taking Aleve 2 tablets twice a day not helpful but the naproxen did and muscle relaxant   No numbness or tingling in her legs  In the morning when she wakes up is hard to walk   Specific cause: work injury   Description:   Location of pain: low back right  Character of pain: sharp and dull ache  Pain radiation:radiates into the right buttocks  Intensity: mild, moderate  Accompanying Signs & Symptoms:  Fever: no  Numbness or weakness in legs:  no  Dysuria or Hematuria: no  Bowel or bladder incontinence: no  History:   Any injury (lifting, bending, twisting): YES- work strain   Work Injury: YES  History of back problems: no prior back problems  Any previous MRI or X-rays: no  Any history of back surgery: no  Any cancer history: no  Precipitating factors:   Worsened by: Bending and Standing.  Alleviating factors:  Improved by: heat   Therapies Tried and outcome: physical therapy and NSAIDS  Feels like becoming a chronic issue for her and never had issues with her back before           Review of Systems  Constitutional, HEENT, cardiovascular, pulmonary, gi and gu systems are negative, except as otherwise noted.      Objective    BP Readings from Last 1 Encounters:   03/11/24 137/85     Pulse Readings from Last 1 Encounters:   03/11/24 87     Wt Readings from Last 1 Encounters:   03/11/24 94.6 kg (208 lb 9.6 oz)     Ht Readings from Last 1 Encounters:   03/11/24 1.645 m (5' 4.75\")     Estimated body mass index is 34.98 kg/m  as calculated from the " "following:    Height as of an earlier encounter on 3/11/24: 1.645 m (5' 4.75\").    Weight as of an earlier encounter on 3/11/24: 94.6 kg (208 lb 9.6 oz).    Temp Readings from Last 1 Encounters:   03/11/24 98.3  F (36.8  C) (Temporal)       Physical Exam   GENERAL: Patient is well nourished, well developed,in no apparent distress, non-toxic, alert, cooperative, and well hydrated  EYES: Eyes grossly normal to inspection and conjunctivae and sclerae normal  HENT:ear canals and TM's normal and nose and mouth without ulcers or lesions   NECK:normal and supple  CARDIAC:regular rates and rhythm, no murmur, click or rub, and no irregular beats  without LE edema bilaterally  RESP: normal respiratory rate and rhythm, lungs clear to auscultation    ABD:soft, nontender  SKIN: Skin color, texture, turgor normal. No rashes or lesions.  MS: extremities normal- no gross deformities noted, gait normal, and normal muscle tone  Patient appears to be in mild to moderate pain, antalgic gait noted. Lumbosacral spine area reveals no local tenderness or mass.  Painful and reduced LS ROM noted. Straight leg raise is equivacol at 70 degrees on left. DTR's, motor strength and sensation normal.  negative findings: no skin lesions, erythema, or scars, positive findings: paraspinal muscle spasm,moves about the exam room comfortably  NEURO: Normal strength and tone, sensory exam grossly normal, mentation intact, and speech normal  PSYCH: Alert, oriented, thought content appropriate,mentation appears normal., affect and mood normal      XR Lumbar Spine 2/3 Views    Result Date: 3/11/2024  LUMBAR SPINE TWO TO THREE VIEWS  3/11/2024 10:24 AM COMPARISON: None. HISTORY: Acute right-sided low back pain with right-sided sciatica. Low back strain, initial encounter.     IMPRESSION: Five lumbar type vertebrae. Grade 1 degenerative anterolisthesis of L4 upon L5; alignment otherwise normal. Vertebral body heights normal. No fractures. Facet arthropathy at " L3-L4, L4-L5 and L5-S1. ROJAS DAVIS MD   SYSTEM ID:  [id number]   Assessment & Plan     Acute right-sided low back pain with right-sided sciatica  - XR Lumbar Spine 2/3 Views  Will follow up and/or notify patient of  results via My Chart to determine further need for followup  Will Start:  - cyclobenzaprine (FLEXERIL) 5 MG tablet  Dispense: 60 tablet; Refill: 1  - naproxen (NAPROSYN) 500 MG tablet  Dispense: 60 tablet; Refill: 0  - MR Lumbar Spine w/o Contrast    Low back strain, initial encounter  Will follow up and/or notify patient of  results via My Chart to determine further need for followup  - XR Lumbar Spine 2/3 Views  Will Start:  - cyclobenzaprine (FLEXERIL) 5 MG tablet  Dispense: 60 tablet; Refill: 1  - naproxen (NAPROSYN) 500 MG tablet  Dispense: 60 tablet; Refill: 0  - MR Lumbar Spine w/o Contrast      Review of prior external note(s) from - [facility] information from [facility]  reviewed  Ordering of each unique test  Prescription drug management   Time spent by me doing chart review, history and exam, documentation and further activities per the note        See Patient Instructions  Patient Instructions   PLAN:   1.   Symptomatic therapy suggested: call prn if symptoms persist or worsen.  prescription for muscle relaxant, prescription for NSAID given, referral to Physical Therapy    2.   Orders Placed This Encounter   Procedures    XR Lumbar Spine 2/3 Views       3. Continue with PT   Lumbar spine xray consider MRI   Will follow up and/or notify patient of  results via My Chart to determine further need for followup   Patient needs to follow up in if no improvement,or sooner if worsening of symptoms or other symptoms develop.              Signed Electronically by: VITALY Caldwell CNP

## 2024-03-14 NOTE — RESULT ENCOUNTER NOTE
James De Santiago,    Attached are your test results.  Lumbar spine shows some degenerative changes but not specific to your pain   Lets go ahead and order the MRI   I will place order. Please call [phone] to schedule.     Please contact us if you have any questions.    Denise Douglas, CNP

## 2024-04-07 DIAGNOSIS — M54.41 ACUTE RIGHT-SIDED LOW BACK PAIN WITH RIGHT-SIDED SCIATICA: ICD-10-CM

## 2024-04-07 DIAGNOSIS — S39.012A LOW BACK STRAIN, INITIAL ENCOUNTER: ICD-10-CM

## 2024-04-08 RX ORDER — NAPROXEN 500 MG/1
500 TABLET ORAL 2 TIMES DAILY WITH MEALS
Qty: 60 TABLET | Refills: 0 | Status: SHIPPED | OUTPATIENT
Start: 2024-04-08 | End: 2024-07-15

## 2024-06-26 ENCOUNTER — ANCILLARY PROCEDURE (OUTPATIENT)
Dept: MRI IMAGING | Facility: CLINIC | Age: 59
End: 2024-06-26
Attending: NURSE PRACTITIONER
Payer: OTHER MISCELLANEOUS

## 2024-06-26 DIAGNOSIS — S39.012A LOW BACK STRAIN, INITIAL ENCOUNTER: ICD-10-CM

## 2024-06-26 DIAGNOSIS — M54.41 ACUTE RIGHT-SIDED LOW BACK PAIN WITH RIGHT-SIDED SCIATICA: ICD-10-CM

## 2024-06-26 DIAGNOSIS — M54.41 ACUTE RIGHT-SIDED LOW BACK PAIN WITH RIGHT-SIDED SCIATICA: Primary | ICD-10-CM

## 2024-06-26 PROCEDURE — 72148 MRI LUMBAR SPINE W/O DYE: CPT | Mod: GC | Performed by: RADIOLOGY

## 2024-06-27 NOTE — RESULT ENCOUNTER NOTE
James De Santiago,    Attached are your test results.  MRi shows a moderate amount of degenerative disc disease and arthritis in your back   Am going to refer you to a back specialist to provide recommendation    Please contact us if you have any questions.   Please be aware that coverage of these services is subject to the terms and limitations of your health insurance plan.  Call member services at your health plan with any benefit or coverage questions.  [facility] will call you to coordinate your care as prescribed by your provider. If you don't hear from a representative within 2 business days, please call [phone].      Denise Douglas, CNP

## 2024-07-03 ENCOUNTER — PATIENT OUTREACH (OUTPATIENT)
Dept: CARE COORDINATION | Facility: CLINIC | Age: 59
End: 2024-07-03
Payer: COMMERCIAL

## 2024-07-15 ENCOUNTER — OFFICE VISIT (OUTPATIENT)
Dept: FAMILY MEDICINE | Facility: CLINIC | Age: 59
End: 2024-07-15
Payer: COMMERCIAL

## 2024-07-15 VITALS
HEIGHT: 63 IN | DIASTOLIC BLOOD PRESSURE: 86 MMHG | BODY MASS INDEX: 36.86 KG/M2 | SYSTOLIC BLOOD PRESSURE: 130 MMHG | OXYGEN SATURATION: 95 % | HEART RATE: 100 BPM | WEIGHT: 208 LBS | RESPIRATION RATE: 16 BRPM | TEMPERATURE: 97.9 F

## 2024-07-15 DIAGNOSIS — E66.01 CLASS 2 SEVERE OBESITY DUE TO EXCESS CALORIES WITH SERIOUS COMORBIDITY AND BODY MASS INDEX (BMI) OF 35.0 TO 35.9 IN ADULT (H): ICD-10-CM

## 2024-07-15 DIAGNOSIS — E66.812 CLASS 2 SEVERE OBESITY DUE TO EXCESS CALORIES WITH SERIOUS COMORBIDITY AND BODY MASS INDEX (BMI) OF 35.0 TO 35.9 IN ADULT (H): ICD-10-CM

## 2024-07-15 DIAGNOSIS — H26.9 CATARACT OF BOTH EYES, UNSPECIFIED CATARACT TYPE: ICD-10-CM

## 2024-07-15 DIAGNOSIS — I10 ESSENTIAL HYPERTENSION WITH GOAL BLOOD PRESSURE LESS THAN 130/85: ICD-10-CM

## 2024-07-15 DIAGNOSIS — Z01.818 PREOP GENERAL PHYSICAL EXAM: Primary | ICD-10-CM

## 2024-07-15 PROCEDURE — G2211 COMPLEX E/M VISIT ADD ON: HCPCS | Performed by: NURSE PRACTITIONER

## 2024-07-15 PROCEDURE — 99214 OFFICE O/P EST MOD 30 MIN: CPT | Performed by: NURSE PRACTITIONER

## 2024-07-15 ASSESSMENT — PAIN SCALES - GENERAL: PAINLEVEL: NO PAIN (0)

## 2024-07-15 NOTE — PATIENT INSTRUCTIONS

## 2024-07-15 NOTE — PROGRESS NOTES
Preoperative Evaluation  [address] [address]  Phone: [phone]  Primary Provider: VITALY Caldwell CNP  Pre-op Performing Provider: VITALY Caldwell CNP  Jul 15, 2024             7/15/2024   Surgical Information   What procedure is being done? Cataract both eyes    Facility or Hospital where procedure/surgery will be performed: [facility];[address] [facility]   Who is doing the procedure / surgery? Dr Josep Faria   Date of surgery / procedure: 7/23/2024 right eye and 8/6/2024 left eye    Time of surgery / procedure: 0800   Where do you plan to recover after surgery? at home with family        Fax number for surgical facility: [phone]      Assessment & Plan     The proposed surgical procedure is considered LOW risk.    Preop general physical exam      Cataract of both eyes, unspecified cataract type      Essential hypertension with goal blood pressure less than 130/85  Controlled on present medications,    Obesity   Healthy diet and exercise reviewed.  Limit pop and juice intake.  Risks of obesity discussed.  Encourage exercise.  Limit TV/Computer/game time to one hour a day.      Risks and Recommendations  The patient has the following additional risks and recommendations for perioperative complications:   - No identified additional risk factors other than previously addressed    Preoperative Medication Instructions  Antiplatelet or Anticoagulation Medication Instructions   - Patient is on no antiplatelet or anticoagulation medications.    Additional Medication Instructions  Take all scheduled medications on the day of surgery    Recommendation  Approval given to proceed with proposed procedure, without further diagnostic evaluation.    Meghan Garcia is a 58 year old, presenting for the following:  Pre-Op Exam          7/15/2024    11:27 AM   Additional Questions   Roomed by Tosin    Accompanied by self         7/15/2024    11:27 AM   Patient Reported Additional Medications   Patient reports taking the following new medications None     HPI related to upcoming procedure: bilateral cataracts         7/15/2024   Pre-Op Questionnaire   Have you ever had a heart attack or stroke? No   Have you ever had surgery on your heart or blood vessels, such as a stent placement, a coronary artery bypass, or surgery on an artery in your head, neck, heart, or legs? No   Do you have chest pain with activity? No   Do you have a history of heart failure? No   Do you currently have a cold, bronchitis or symptoms of other infection? No   Do you have a cough, shortness of breath, or wheezing? No   Do you or anyone in your family have previous history of blood clots? No   Do you or does anyone in your family have a serious bleeding problem such as prolonged bleeding following surgeries or cuts? No   Have you ever had problems with anemia or been told to take iron pills? No   Have you had any abnormal blood loss such as black, tarry or bloody stools, or abnormal vaginal bleeding? No   Have you ever had a blood transfusion? No   Are you willing to have a blood transfusion if it is medically needed before, during, or after your surgery? Yes   Have you or any of your relatives ever had problems with anesthesia? No   Do you have sleep apnea, excessive snoring or daytime drowsiness? No   Do you have any artifical heart valves or other implanted medical devices like a pacemaker, defibrillator, or continuous glucose monitor? No   Do you have artificial joints? No   Are you allergic to latex? No        Health Care Directive  Patient does not have a Health Care Directive or Living Will: Discussed advance care planning with patient; however, patient declined at this time.    Preoperative Review of    reviewed - no record of controlled substances prescribed.      Status of Chronic Conditions:  See problem list for active  medical problems.  Problems all longstanding and stable, except as noted/documented.  See ROS for pertinent symptoms related to these conditions.    HYPERTENSION - Patient has longstanding history of HTN , currently denies any symptoms referable to elevated blood pressure. Specifically denies chest pain, palpitations, dyspnea, orthopnea, PND or peripheral edema. Blood pressure readings have been in normal range. Current medication regimen is as listed below. Patient denies any side effects of medication.     Patient Active Problem List    Diagnosis Date Noted    Acute right-sided low back pain without sciatica 12/18/2023     Priority: Medium    Intramural leiomyoma of uterus 05/15/2017     Priority: Medium     Formatting of this note might be different from the original.  Multiple fibroids. Evaluated by Dr. Ontiveros in 2013.      Chondromalacia of patella 04/26/2013     Priority: Medium    Vitamin D deficiency 11/19/2010     Priority: Medium      Past Medical History:   Diagnosis Date    Chondromalacia of patella 4/26/2013     Past Surgical History:   Procedure Laterality Date    HRW HB PF ASPIR/INJ GANGLION CYST Left     wrist     Current Outpatient Medications   Medication Sig Dispense Refill    losartan (COZAAR) 100 MG tablet Take 1 tablet (100 mg) by mouth daily 90 tablet 3    Vitamin D, Cholecalciferol, 25 MCG (1000 UT) CAPS          Allergies   Allergen Reactions    Calcitonin ([place]) Anaphylaxis    Pyrimethamine Anaphylaxis     Pt gets diffuse swelling  (metakelfin)    Other Environmental Allergy      PN: LW Reaction: metacalfin for malaria in [place]    Acetaminophen Rash        Social History     Tobacco Use    Smoking status: Never     Passive exposure: Never    Smokeless tobacco: Never   Substance Use Topics    Alcohol use: Not Currently     Family History   Problem Relation Age of Onset    Hypertension Mother     Thyroid Disease Mother     Coronary Artery Disease Father     Breast Cancer Maternal Aunt   "   Ovarian Cancer Paternal Aunt     Diabetes No family hx of     Hyperlipidemia No family hx of     Kidney Disease No family hx of     Cerebrovascular Disease No family hx of     Depression No family hx of     Mental Illness No family hx of     Uterine Cancer No family hx of     Prostate Cancer No family hx of     Colorectal Cancer No family hx of     Pancreatic Cancer No family hx of     Lung Cancer No family hx of     Melanoma No family hx of     Autoimmune Disease No family hx of      History   Drug Use Unknown             Review of Systems  CONSTITUTIONAL: NEGATIVE for fever, chills, change in weight  INTEGUMENTARY/SKIN: NEGATIVE for non-healing lesion   EYES: POSITIVE for Hx cataracts and NEGATIVE for diplopia and photophobia  ENT/MOUTH: NEGATIVE for ear, mouth and throat problems  RESP: NEGATIVE for significant cough or SOB  CV: NEGATIVE for chest pain, palpitations or peripheral edema  GI: NEGATIVE for nausea, abdominal pain, heartburn, or change in bowel habits  : NEGATIVE for frequency, dysuria, or hematuria  MUSCULOSKELETAL: NEGATIVE for significant arthralgias or myalgia  NEURO: NEGATIVE for weakness, dizziness or paresthesias  ENDOCRINE: NEGATIVE for temperature intolerance, skin/hair changes  HEME: NEGATIVE for bleeding problems  PSYCHIATRIC: NEGATIVE for changes in mood or affect    Objective    /86 (BP Location: Right arm, Patient Position: Sitting, Cuff Size: Adult Large)   Pulse 100   Temp 97.9  F (36.6  C) (Oral)   Resp 16   Ht 1.607 m (5' 3.25\")   Wt 94.3 kg (208 lb)   SpO2 95%   BMI 36.55 kg/m     Estimated body mass index is 36.55 kg/m  as calculated from the following:    Height as of this encounter: 1.607 m (5' 3.25\").    Weight as of this encounter: 94.3 kg (208 lb).  Wt Readings from Last 4 Encounters:   07/15/24 94.3 kg (208 lb)   03/11/24 94.6 kg (208 lb 9.6 oz)   12/03/23 91.6 kg (201 lb 14.4 oz)   11/22/23 89.8 kg (198 lb)      BP Readings from Last 6 Encounters: "   07/15/24 130/86   03/11/24 137/85   12/03/23 (!) 146/83   11/22/23 (!) 148/91   07/20/23 (!) 142/88   02/25/22 (!) 142/82      Physical Exam  GENERAL: alert and no distress  EYES: Eyes grossly normal to inspection and conjunctivae and sclerae normal  HENT: ear canals and TM's normal, nose and mouth without ulcers or lesions  NECK: no adenopathy, no asymmetry, masses, or scars  RESP: lungs clear to auscultation - no rales, rhonchi or wheezes  CV: regular rates and rhythm, no murmur, click or rub, peripheral pulses strong, and no peripheral edema  ABDOMEN: soft, nontender  MS: no gross musculoskeletal defects noted, no edema  SKIN: no suspicious lesions or rashes  NEURO: Normal strength and tone, mentation intact and speech normal  PSYCH: mentation appears normal, affect normal/bright  LYMPH: no cervical adenopathy    Recent Labs   Lab Test 07/20/23  0840   HGB 14.4         POTASSIUM 3.9   CR 0.92        Diagnostics  No labs were ordered during this visit.   No EKG required for low risk surgery (cataract, skin procedure, breast biopsy, etc).    Revised Cardiac Risk Index (RCRI)  The patient has the following serious cardiovascular risks for perioperative complications:   - No serious cardiac risks = 0 points     RCRI Interpretation: 0 points: Class I (very low risk - 0.4% complication rate)         Signed Electronically by: VITALY Caldwell CNP  Copy of this evaluation report is provided to requesting physician.

## 2024-07-16 PROBLEM — E66.812 CLASS 2 SEVERE OBESITY DUE TO EXCESS CALORIES WITH SERIOUS COMORBIDITY IN ADULT (H): Status: ACTIVE | Noted: 2024-07-16

## 2024-07-16 PROBLEM — E66.01 CLASS 2 SEVERE OBESITY DUE TO EXCESS CALORIES WITH SERIOUS COMORBIDITY IN ADULT (H): Status: ACTIVE | Noted: 2024-07-16

## 2024-07-22 NOTE — TELEPHONE ENCOUNTER
REASON FOR VISIT: Low back strain, Acute right-sided low back pain with right-sided sciatica    DATE OF APPT: 8/6/2024   NOTES (FOR ALL VISITS) STATUS DETAILS   OFFICE NOTE from referring provider Internal [facility] Denise Lei APRN CNP   MEDICATION LIST Internal    IMAGING  (FOR ALL VISITS)     XR Internal [facility]  XR Lumbar spine 3/11/2024   MRI (HEAD, NECK, SPINE) Internal [facility] [place]  MR Lumbar spine 6/26/2024   CT (HEAD, NECK, SPINE) N/A         Cares from:     V/S & pain: tachycardic and hypotensive at times. No pain reported.  Neuro: alert, orientedx4. Hmong speaker but able to english.  Respiratory:  clear lung field but with dimish sound in lower lobe. On room air  Skin: with generalized rash, itchy,non painful. On triamcinolone prn.  GI/: with chronic incontinence, voids freely.  Nutrition: combination diet regular diet adult  Lines/drains: PIVx2. One is tko.  Activity: independently walks to the bath room.    Events this shift: call light w/in reach and able to make needs known, will continue to monitor.    Plan: no untoward signs and symptoms. for continuity of care      Goal Outcome Evaluation:      Plan of Care Reviewed With: patient

## 2024-07-25 NOTE — PROGRESS NOTES
"    SUBJECTIVE:  HPI:  Radha De Santiago  Is a 58 year old female who presents for new patient evaluation of right low back pain upon referral from Dr. 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 and felt a pull in her right lower back   Has had 3 sessions of physical therapy   and is still having pain   Is taking Aleve 2 tablets twice a day not helpful but the naproxen did and muscle relaxant   No numbness or tingling in her legs  In the morning when she wakes up is hard to walk   Specific cause: work injury   Description:   Location of pain: low back right  Character of pain: sharp and dull ache  Pain radiation:radiates into the right buttocks  Intensity: mild, moderate  Accompanying Signs & Symptoms:  Fever: no  Numbness or weakness in legs:  no  Dysuria or Hematuria: no  Bowel or bladder incontinence: no  History:   Any injury (lifting, bending, twisting): YES- work strain   Work Injury: YES  History of back problems: no prior back problems  Any previous MRI or X-rays: no  Any history of back surgery: no  Any cancer history: no  Precipitating factors:   Worsened by: Bending and Standing.  Alleviating factors:  Improved by: heat   Therapies Tried and outcome: physical therapy and NSAIDS  Feels like becoming a chronic issue for her and never had issues with her back before\"      11/22/2023 progress note records:  \"She was at work at [employer] on Friday (5 days ago). 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  Later that night she woke up to go to the bathroom and standing up from the toilet she felt a sharp pain to the same area. Tried ibuprofen, muscle rub and ice.  The next morning she was stiff but " "able to go to work.  She notices it gets a little worse with sitting and improves with walking.     No numbness to the legs, no weakness.  No bladder weakness.  No hx of previous injury.  Sleep is fine.\"  Diagnosis was low back strain    [employer] for the work comp [employer] sent me a large packet of records that I reviewed.  They requested that I complete a causation report which was not attached to the records that they faxed to me.    History 8/6/2024:  Work comp injury November 17, 2023  Employer: [employer] confirms the above history and also confirms that there are no radicular or cauda equina symptoms and no other red flags on review of system.  She also denies any prior problems with back or pelvic issues.  She points to the right SI joint as the site of her ongoing and persistent pain ever since her original injury.  She is currently working without formal restrictions but she is trying to avoid patient lifting another exertions that bring on her pain.  She did not have a trained pelvic joint therapist when she was going through PT.      SYMPTOMS WORSENED WITH bending and lifting    SYMPTOMS IMPROVED WITH walking and ibuprofen, Voltaren gel, naproxen    Pain score and diagram reviewed.  See questionnaire.      ROS: .  Otherwise negative for bowel/bladder dysfunction, balance changes, headache, leg pain/numbness/weakness, fevers, chills, night sweats, unexplained weight loss;  otherwise unremarkable.   See the patient's intake questionnaire from today for details.        MEDICATIONS:  Reviewed.    ALLERGIES:  Reviewed.     PAST MEDICAL/SURGICAL HISTORY:   Nothing pertinent    SOCIAL HX: She is originally from [facility] [facility].  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  Sports hobbies and activities: Walking      OBJECTIVE:    IMAGING: Images and reports reviewed    MR LUMBAR SPINE W/O CONTRAST 6/26/2024     L3-4: Circumferential disc bulge. Facet arthropathy. Mild-to-moderate  right and " mild left, right greater than left, neural foraminal  stenosis. No high-grade spinal canal stenosis.     L4-5: Circumferential disc bulge. Facet hypertrophy. Ligamentum flavum  thickening. Mild-to-moderate left and mild right neural foraminal  stenosis. No high-grade spinal canal stenosis     L5-S1: Circumferential disc bulge. Facet and ligamentum flavum  hypertrophy. Facet effusions bilaterally. Mild to moderate right and  mild left neural foraminal stenosis. Spinal canal is widely patent.                                                                 Impression:   1. Multilevel lumbar spondylosis greatest at L3-S1 levels with  associated circumferential disc bulge, ligamentum flavum and facet  hypertrophy. Mild to moderate right neural foraminal stenosis at 4 and  L5-S1 and mild to moderate left neural foraminal L4-5. There is no  high-grade spinal canal stenosis.  2. No fracture deformity of the lumbar vertebrae. There is no abnormal  signal of the marrow or adjacent soft tissues of the lumbar spine.    LUMBAR SPINE TWO TO THREE VIEWS  3/11/2024                                                                 IMPRESSION: Five lumbar type vertebrae. Grade 1 degenerative  anterolisthesis of L4 upon L5; alignment otherwise normal. Vertebral  body heights normal. No fractures. Facet arthropathy at L3-L4, L4-L5  and L5-S1.    EXAMINATION:    --CONSTITUTIONAL:   No acute distress.  The patient is well nourished and well groomed.  Mesomorphic frame with an elevated BMI.  She transitions well but moves a bit stiffly about the room.  Positive right Devin finger sign  --SKIN:  Skin over the face, bilateral lower extremities, and posterior torso is clean, dry, intact without rashes.    --GAIT:  is non-antalgic. Flat foot, heel and toe walking:  normal   .  Squat and rise   normal    some pain in the right SI area with full squat.  --STANDING EXAMINATION:    Symmetry of spine/pelvis   unremarkable   .      Range of motion  limited and slow.  Pain in the right SI area with flexion to the knees and more so with extension which is 70% restricted.   Standing flexion    positive right.    Devin's sign   negative    .     Stork test    positive right.   --NEUROLOGICAL:     Motor or sensory and straight leg raise testing normal  --PELVIC/HIP JOINTS:                Long Sitting    right long to short.    Spring testing negative SI and lumbar.  Decreased left SI complaint.      PELVIC ALIGNMENT right inferior innominate shear.   --LUMBAR/GLUTEAL MUSCLES: Minimal tenderness right gluteus medius.  Trochanter is nontender    Procedure note-OMT:  Manual medicine restore normal pelvic alignment.  Leg lengths were even with negative long sitting test.  Better compliance with spring testing of the left SI joint.  She was able to bend forward and touch her toes for the first time since the injury and she had restoration of painless lumbar extension        ASSESSMENT: Radha De Santiago is a 58 year old female who presents  today for new patient evaluation of:    Work comp injury 11/17/2023  Pelvic Joint Dysfunction manifesting as a right inferior innominate shear-encouraging response to OMT.  Low back pain  Imaging of her spine shows a gracefully aging spine and the problem is not in her back but in her pelvis, and this is all related to her original work injury      PLAN:  She has never had treatment like today and she has never had a response like today.  I am quite encouraged that we will get her better by changing her therapy to a pelvic stabilization approach and she is to do the self correction each morning that I taught her today.  Follow-up with me in 6 weeks.  She is already doing unrestricted work so there is no reason to apply restrictions at this time.  She is getting coworkers to assist her when it comes to heavy lifting.  Her body habitus suggest that she is not at risk of significant pelvic instability.  As result I am not going to  order an SI belt for her.    Advised patient to call or return early if symptoms worsen, or having problems controlling bladder and bowel function or worsening leg weakness.     Please note: Voice recognition software was used in this dictation.  It may therefore contain typographical errors.    David Hernandez MD

## 2024-08-06 ENCOUNTER — OFFICE VISIT (OUTPATIENT)
Dept: NEUROSURGERY | Facility: CLINIC | Age: 59
End: 2024-08-06
Payer: OTHER MISCELLANEOUS

## 2024-08-06 ENCOUNTER — PRE VISIT (OUTPATIENT)
Dept: NEUROSURGERY | Facility: CLINIC | Age: 59
End: 2024-08-06
Payer: COMMERCIAL

## 2024-08-06 VITALS
HEART RATE: 99 BPM | DIASTOLIC BLOOD PRESSURE: 92 MMHG | HEIGHT: 63 IN | BODY MASS INDEX: 36.86 KG/M2 | WEIGHT: 208 LBS | SYSTOLIC BLOOD PRESSURE: 136 MMHG

## 2024-08-06 DIAGNOSIS — M79.18 MYOFASCIAL PAIN: ICD-10-CM

## 2024-08-06 DIAGNOSIS — S39.012A LOW BACK STRAIN, INITIAL ENCOUNTER: ICD-10-CM

## 2024-08-06 DIAGNOSIS — M99.05 SOMATIC DYSFUNCTION OF PELVIS REGION: ICD-10-CM

## 2024-08-06 PROCEDURE — 99203 OFFICE O/P NEW LOW 30 MIN: CPT | Mod: 25 | Performed by: PREVENTIVE MEDICINE

## 2024-08-06 PROCEDURE — 98925 OSTEOPATH MANJ 1-2 REGIONS: CPT | Performed by: PREVENTIVE MEDICINE

## 2024-08-06 NOTE — LETTER
"8/6/2024      Radha De Santiago  [address]  [place] [address]      Dear Colleague,    Thank you for referring your patient, Radha De Santiago, to the [facility] [place]. Please see a copy of my visit note below.        SUBJECTIVE:  HPI:  Radha De Santiago  Is a 58 year old female who presents for new patient evaluation of right low back pain upon referral from Dr. 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 and felt a pull in her right lower back   Has had 3 sessions of physical therapy   and is still having pain   Is taking Aleve 2 tablets twice a day not helpful but the naproxen did and muscle relaxant   No numbness or tingling in her legs  In the morning when she wakes up is hard to walk   Specific cause: work injury   Description:   Location of pain: low back right  Character of pain: sharp and dull ache  Pain radiation:radiates into the right buttocks  Intensity: mild, moderate  Accompanying Signs & Symptoms:  Fever: no  Numbness or weakness in legs:  no  Dysuria or Hematuria: no  Bowel or bladder incontinence: no  History:   Any injury (lifting, bending, twisting): YES- work strain   Work Injury: YES  History of back problems: no prior back problems  Any previous MRI or X-rays: no  Any history of back surgery: no  Any cancer history: no  Precipitating factors:   Worsened by: Bending and Standing.  Alleviating factors:  Improved by: heat   Therapies Tried and outcome: physical therapy and NSAIDS  Feels like becoming a chronic issue for her and never had issues with her back before\"      11/22/2023 progress note records:  \"She was at work at [employer] on Friday (5 days ago). 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  Later that night she woke up to go to the bathroom and standing up from the toilet she felt a sharp pain to the same area. Tried ibuprofen, muscle rub and ice.  The next morning she was stiff but able to go to work.  She notices it gets a little worse with sitting and improves with walking.     No numbness to the legs, no weakness.  No bladder weakness.  No hx of previous injury.  Sleep is fine.\"  Diagnosis was low back strain    [employer] [facility] for the work comp carrier sent me a large packet of records that I reviewed.  They requested that I complete a causation report which was not attached to the records that they faxed to me.    History 8/6/2024:  Work comp injury November 17, 2023  Employer: [employer] confirms the above history and also confirms that there are no radicular or cauda equina symptoms and no other red flags on review of system.  She also denies any prior problems with back or pelvic issues.  She points to the right SI joint as the site of her ongoing and persistent pain ever since her original injury.  She is currently working without formal restrictions but she is trying to avoid patient lifting another exertions that bring on her pain.  She did not have a trained pelvic joint therapist when she was going through PT.      SYMPTOMS WORSENED WITH bending and lifting    SYMPTOMS IMPROVED WITH walking and ibuprofen, Voltaren gel, naproxen    Pain score and diagram reviewed.  See questionnaire.      ROS: .  Otherwise negative for bowel/bladder dysfunction, balance changes, headache, leg pain/numbness/weakness, fevers, chills, night sweats, unexplained weight loss;  otherwise unremarkable.   See the patient's intake questionnaire from today for details.        MEDICATIONS:  Reviewed.    ALLERGIES:  Reviewed.     PAST MEDICAL/SURGICAL HISTORY:   Nothing pertinent    SOCIAL HX: She is originally from [place].  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  Sports hobbies and activities: Walking      OBJECTIVE:    IMAGING: Images and reports reviewed    MR LUMBAR SPINE W/O CONTRAST 6/26/2024     L3-4: Circumferential disc bulge. Facet arthropathy. Mild-to-moderate  right and mild left, right greater than left, neural foraminal  stenosis. No high-grade spinal canal stenosis.     L4-5: Circumferential disc bulge. Facet hypertrophy. Ligamentum flavum  thickening. Mild-to-moderate left and mild right neural foraminal  stenosis. No high-grade spinal canal stenosis     L5-S1: Circumferential disc bulge. Facet and ligamentum flavum  hypertrophy. Facet effusions bilaterally. Mild to moderate right and  mild left neural foraminal stenosis. Spinal canal is widely patent.                                                                 Impression:   1. Multilevel lumbar spondylosis greatest at L3-S1 levels with  associated circumferential disc bulge, ligamentum flavum and facet  hypertrophy. Mild to moderate right neural foraminal stenosis at 4 and  L5-S1 and mild to moderate left neural foraminal L4-5. There is no  high-grade spinal canal stenosis.  2. No fracture deformity of the lumbar vertebrae. There is no abnormal  signal of the marrow or adjacent soft tissues of the lumbar spine.    LUMBAR SPINE TWO TO THREE VIEWS  3/11/2024                                                                 IMPRESSION: Five lumbar type vertebrae. Grade 1 degenerative  anterolisthesis of L4 upon L5; alignment otherwise normal. Vertebral  body heights normal. No fractures. Facet arthropathy at L3-L4, L4-L5  and L5-S1.    EXAMINATION:    --CONSTITUTIONAL:   No acute distress.  The patient is well nourished and well groomed.  Mesomorphic frame with an elevated BMI.  She transitions well but moves a bit stiffly about the room.  Positive right Devin finger sign  --SKIN:  Skin over the face, bilateral lower extremities, and posterior torso is clean, dry,  intact without rashes.    --GAIT:  is non-antalgic. Flat foot, heel and toe walking:  normal   .  Squat and rise   normal    some pain in the right SI area with full squat.  --STANDING EXAMINATION:    Symmetry of spine/pelvis   unremarkable   .      Range of motion limited and slow.  Pain in the right SI area with flexion to the knees and more so with extension which is 70% restricted.   Standing flexion    positive right.    Devin's sign   negative    .     Stork test    positive right.   --NEUROLOGICAL:     Motor or sensory and straight leg raise testing normal  --PELVIC/HIP JOINTS:                Long Sitting    right long to short.    Spring testing negative SI and lumbar.  Decreased left SI complaint.      PELVIC ALIGNMENT right inferior innominate shear.   --LUMBAR/GLUTEAL MUSCLES: Minimal tenderness right gluteus medius.  Trochanter is nontender    Procedure note-OMT:  Manual medicine restore normal pelvic alignment.  Leg lengths were even with negative long sitting test.  Better compliance with spring testing of the left SI joint.  She was able to bend forward and touch her toes for the first time since the injury and she had restoration of painless lumbar extension        ASSESSMENT: Radha De Santiago is a 58 year old female who presents  today for new patient evaluation of:    Work comp injury 11/17/2023  Pelvic Joint Dysfunction manifesting as a right inferior innominate shear-encouraging response to OMT.  Low back pain  Imaging of her spine shows a gracefully aging spine and the problem is not in her back but in her pelvis, and this is all related to her original work injury      PLAN:  She has never had treatment like today and she has never had a response like today.  I am quite encouraged that we will get her better by changing her therapy to a pelvic stabilization approach and she is to do the self correction each morning that I taught her today.  Follow-up with me in 6 weeks.  She is already doing  unrestricted work so there is no reason to apply restrictions at this time.  She is getting coworkers to assist her when it comes to heavy lifting.  Her body habitus suggest that she is not at risk of significant pelvic instability.  As result I am not going to order an SI belt for her.    Advised patient to call or return early if symptoms worsen, or having problems controlling bladder and bowel function or worsening leg weakness.     Please note: Voice recognition software was used in this dictation.  It may therefore contain typographical errors.    David Hernandez MD             Again, thank you for allowing me to participate in the care of your patient.        Sincerely,        David Hernandez MD

## 2024-08-06 NOTE — PATIENT INSTRUCTIONS
"Radha, it was very nice to meet you and I am glad you came in so we could identify and begin treatment for your Pelvic Joint Dysfunction and I am quite encouraged that this will get you back to where you were before.  Do the self correction each morning as described below and see the assessment and plan below for further details of our discussion today.    ASSESSMENT: Radha De Santiago is a 58 year old female who presents  today for new patient evaluation of:    Work comp injury 11/17/2023  Pelvic Joint Dysfunction manifesting as a right inferior innominate shear-encouraging response to OMT.  Low back pain  Imaging of her spine shows a gracefully aging spine and the problem is not in her back but in her pelvis, and this is all related to her original work injury      PLAN:  She has never had treatment like today and she has never had a response like today.  I am quite encouraged that we will get her better by changing her therapy to a pelvic stabilization approach and she is to do the self correction each morning that I taught her today.  Follow-up with me in 6 weeks.  She is already doing unrestricted work so there is no reason to apply restrictions at this time.  She is getting coworkers to assist her when it comes to heavy lifting.  Her body habitus suggest that she is not at risk of significant pelvic instability.  As result I am not going to order an SI belt for her.        PELVIC JOINT SELF CORRECTION EXERCISES      It is best to do this first thing in the morning, and can be repeated once or twice during the day.    \"SHOTGUN\" TECHNIQUE:  This loosens up the front and back of the pelvis.  Do this before the Broomstick exercise.  Use on object such as a rectangular laundry basket.  Lie on your back with your knees bent, feet together and flat on the floor.    Spread your knees approximately 12-24 inches around the outside of an upright laundry basket.  Squeeze your knees together, breathing as you do this. "    Concentrate on keeping your buttocks relaxed and on the ground.    A brief discomfort in the front of the pelvis and even a popping sound is normal.  Hold the squeeze for 3-5 seconds.    Relax for 3-5 seconds.    Repeat 2 more times.    Now reverse your knee position to the inside of the upside down laundry basket while still lying with your knees bent up, feet on the ground.  Pull your knees apart, breathing easy as you do this.  Hold the squeeze for 3-5 seconds.    Relax for 3-5 seconds.    Repeat 2 more times.    BROOMSTICK EXERCISE:  Lie on your back with your knees bent.  Slide a broomstick or similar object above one knee, and below the opposite knee.  Firmly hold the stick with your hands will bringing your knees closed to your belly, preferably high enough that your back can rest flat on the ground.  Still holding the stick, scissors your legs against the stick, breathing as you do this.    (Push down to your foot with leg on top of the broomstick, and lift up to your chin with the leg below the broomstick).  Hold the squeeze for 3-5 seconds.    Relax for 3-5 seconds.    Repeat 2 more times.  Switch the position of the broomstick above the other knee, and below the first knee.  Repeat the exercise as above in this new position.       PAST SURGICAL HISTORY:  History of  section x2 ,     History of hip replacement right 2021    S/P lobectomy of lung RULobectomy 2019    Stented coronary artery x2 stents 2018

## 2024-08-06 NOTE — NURSING NOTE
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  Work status?  Full time.    Sports: no  Activities: no               BP (!) 136/92 (BP Location: Right arm, Patient Position: Sitting, Cuff Size: Adult Regular)   Pulse 99   Ht 1.607 m (5' 3.25\")   Wt 94.3 kg (208 lb)   BMI 36.55 kg/m        Allergies   Allergen Reactions    Calcitonin ([place]) Anaphylaxis    Pyrimethamine Anaphylaxis     Pt gets diffuse swelling  (metakelfin)    Other Environmental Allergy      PN: LW Reaction: metacalfin for malaria in [place]    Acetaminophen Rash       Current Outpatient Medications   Medication Sig Dispense Refill    calcium carbonate-vitamin D (OSCAL) 500-5 MG-MCG tablet Take 1 tablet by mouth 2 times daily      losartan (COZAAR) 100 MG tablet Take 1 tablet (100 mg) by mouth daily 90 tablet 3    Vitamin D, Cholecalciferol, 25 MCG (1000 UT) CAPS        No current facility-administered medications for this visit.         David Coker MA    "

## 2024-08-08 ENCOUNTER — ANCILLARY PROCEDURE (OUTPATIENT)
Dept: MAMMOGRAPHY | Facility: CLINIC | Age: 59
End: 2024-08-08
Attending: NURSE PRACTITIONER
Payer: COMMERCIAL

## 2024-08-08 ENCOUNTER — OFFICE VISIT (OUTPATIENT)
Dept: FAMILY MEDICINE | Facility: CLINIC | Age: 59
End: 2024-08-08
Payer: COMMERCIAL

## 2024-08-08 VITALS
RESPIRATION RATE: 16 BRPM | OXYGEN SATURATION: 100 % | DIASTOLIC BLOOD PRESSURE: 85 MMHG | HEART RATE: 87 BPM | WEIGHT: 208.19 LBS | SYSTOLIC BLOOD PRESSURE: 137 MMHG | TEMPERATURE: 97.9 F | BODY MASS INDEX: 35.54 KG/M2 | HEIGHT: 64 IN

## 2024-08-08 DIAGNOSIS — Z13.29 SCREENING FOR THYROID DISORDER: ICD-10-CM

## 2024-08-08 DIAGNOSIS — E66.01 CLASS 2 SEVERE OBESITY DUE TO EXCESS CALORIES WITH SERIOUS COMORBIDITY AND BODY MASS INDEX (BMI) OF 36.0 TO 36.9 IN ADULT (H): ICD-10-CM

## 2024-08-08 DIAGNOSIS — E66.812 CLASS 2 SEVERE OBESITY DUE TO EXCESS CALORIES WITH SERIOUS COMORBIDITY AND BODY MASS INDEX (BMI) OF 36.0 TO 36.9 IN ADULT (H): ICD-10-CM

## 2024-08-08 DIAGNOSIS — Z00.00 ROUTINE GENERAL MEDICAL EXAMINATION AT A HEALTH CARE FACILITY: Primary | ICD-10-CM

## 2024-08-08 DIAGNOSIS — Z13.0 SCREENING FOR DISORDER OF BLOOD AND BLOOD-FORMING ORGANS: ICD-10-CM

## 2024-08-08 DIAGNOSIS — Z12.31 VISIT FOR SCREENING MAMMOGRAM: ICD-10-CM

## 2024-08-08 DIAGNOSIS — Z13.1 SCREENING FOR DIABETES MELLITUS (DM): ICD-10-CM

## 2024-08-08 DIAGNOSIS — I10 ESSENTIAL HYPERTENSION WITH GOAL BLOOD PRESSURE LESS THAN 130/85: ICD-10-CM

## 2024-08-08 DIAGNOSIS — Z13.6 CARDIOVASCULAR SCREENING; LDL GOAL LESS THAN 130: ICD-10-CM

## 2024-08-08 LAB
ALBUMIN SERPL BCG-MCNC: 4.3 G/DL (ref 3.5–5.2)
ALP SERPL-CCNC: 57 U/L (ref 40–150)
ALT SERPL W P-5'-P-CCNC: 25 U/L (ref 0–50)
ANION GAP SERPL CALCULATED.3IONS-SCNC: 9 MMOL/L (ref 7–15)
AST SERPL W P-5'-P-CCNC: 31 U/L (ref 0–45)
BILIRUB SERPL-MCNC: 0.9 MG/DL
BUN SERPL-MCNC: 13.8 MG/DL (ref 6–20)
CALCIUM SERPL-MCNC: 9.6 MG/DL (ref 8.8–10.4)
CHLORIDE SERPL-SCNC: 106 MMOL/L (ref 98–107)
CHOLEST SERPL-MCNC: 221 MG/DL
CREAT SERPL-MCNC: 0.78 MG/DL (ref 0.51–0.95)
CREAT UR-MCNC: 166 MG/DL
EGFRCR SERPLBLD CKD-EPI 2021: 88 ML/MIN/1.73M2
ERYTHROCYTE [DISTWIDTH] IN BLOOD BY AUTOMATED COUNT: 12.8 % (ref 10–15)
FASTING STATUS PATIENT QL REPORTED: YES
FASTING STATUS PATIENT QL REPORTED: YES
GLUCOSE SERPL-MCNC: 105 MG/DL (ref 70–99)
HCO3 SERPL-SCNC: 26 MMOL/L (ref 22–29)
HCT VFR BLD AUTO: 44 % (ref 35–47)
HDLC SERPL-MCNC: 82 MG/DL
HGB BLD-MCNC: 14.4 G/DL (ref 11.7–15.7)
LDLC SERPL CALC-MCNC: 128 MG/DL
MCH RBC QN AUTO: 30 PG (ref 26.5–33)
MCHC RBC AUTO-ENTMCNC: 32.7 G/DL (ref 31.5–36.5)
MCV RBC AUTO: 92 FL (ref 78–100)
MICROALBUMIN UR-MCNC: <12 MG/L
MICROALBUMIN/CREAT UR: NORMAL MG/G{CREAT}
NONHDLC SERPL-MCNC: 139 MG/DL
PLATELET # BLD AUTO: 325 10E3/UL (ref 150–450)
POTASSIUM SERPL-SCNC: 4.2 MMOL/L (ref 3.4–5.3)
PROT SERPL-MCNC: 7 G/DL (ref 6.4–8.3)
RBC # BLD AUTO: 4.8 10E6/UL (ref 3.8–5.2)
SODIUM SERPL-SCNC: 141 MMOL/L (ref 135–145)
TRIGL SERPL-MCNC: 57 MG/DL
TSH SERPL DL<=0.005 MIU/L-ACNC: 0.86 UIU/ML (ref 0.3–4.2)
WBC # BLD AUTO: 4.6 10E3/UL (ref 4–11)

## 2024-08-08 PROCEDURE — 85027 COMPLETE CBC AUTOMATED: CPT | Performed by: NURSE PRACTITIONER

## 2024-08-08 PROCEDURE — 82043 UR ALBUMIN QUANTITATIVE: CPT | Performed by: NURSE PRACTITIONER

## 2024-08-08 PROCEDURE — 36415 COLL VENOUS BLD VENIPUNCTURE: CPT | Performed by: NURSE PRACTITIONER

## 2024-08-08 PROCEDURE — 77063 BREAST TOMOSYNTHESIS BI: CPT | Mod: GC | Performed by: STUDENT IN AN ORGANIZED HEALTH CARE EDUCATION/TRAINING PROGRAM

## 2024-08-08 PROCEDURE — 84443 ASSAY THYROID STIM HORMONE: CPT | Performed by: NURSE PRACTITIONER

## 2024-08-08 PROCEDURE — 80053 COMPREHEN METABOLIC PANEL: CPT | Performed by: NURSE PRACTITIONER

## 2024-08-08 PROCEDURE — 80061 LIPID PANEL: CPT | Performed by: NURSE PRACTITIONER

## 2024-08-08 PROCEDURE — 99213 OFFICE O/P EST LOW 20 MIN: CPT | Mod: 25 | Performed by: NURSE PRACTITIONER

## 2024-08-08 PROCEDURE — 99396 PREV VISIT EST AGE 40-64: CPT | Performed by: NURSE PRACTITIONER

## 2024-08-08 PROCEDURE — 82570 ASSAY OF URINE CREATININE: CPT | Performed by: NURSE PRACTITIONER

## 2024-08-08 PROCEDURE — 77067 SCR MAMMO BI INCL CAD: CPT | Mod: GC | Performed by: STUDENT IN AN ORGANIZED HEALTH CARE EDUCATION/TRAINING PROGRAM

## 2024-08-08 RX ORDER — LOSARTAN POTASSIUM 100 MG/1
100 TABLET ORAL DAILY
Qty: 90 TABLET | Refills: 3 | Status: SHIPPED | OUTPATIENT
Start: 2024-08-08

## 2024-08-08 SDOH — HEALTH STABILITY: PHYSICAL HEALTH: ON AVERAGE, HOW MANY MINUTES DO YOU ENGAGE IN EXERCISE AT THIS LEVEL?: PATIENT DECLINED

## 2024-08-08 SDOH — HEALTH STABILITY: PHYSICAL HEALTH
ON AVERAGE, HOW MANY DAYS PER WEEK DO YOU ENGAGE IN MODERATE TO STRENUOUS EXERCISE (LIKE A BRISK WALK)?: PATIENT DECLINED

## 2024-08-08 ASSESSMENT — PAIN SCALES - GENERAL: PAINLEVEL: NO PAIN (0)

## 2024-08-08 ASSESSMENT — SOCIAL DETERMINANTS OF HEALTH (SDOH): HOW OFTEN DO YOU GET TOGETHER WITH FRIENDS OR RELATIVES?: PATIENT DECLINED

## 2024-08-08 NOTE — RESULT ENCOUNTER NOTE
Jmaes De Santiago,    Attached are your test results.  -Normal red blood cell (hgb) levels, normal white blood cell count and normal platelet levels.   Please contact us if you have any questions.    Denise Douglas, CNP

## 2024-08-08 NOTE — PATIENT INSTRUCTIONS
PLAN:   1.   Symptomatic therapy suggested: Continue current medications as prescribed.    Increase calcium to 1000mg and 800iu Vit D   2.  Orders Placed This Encounter   Medications    losartan (COZAAR) 100 MG tablet     Sig: Take 1 tablet (100 mg) by mouth daily     Dispense:  90 tablet     Refill:  3     Orders Placed This Encounter   Procedures    Albumin Random Urine Quantitative with Creat Ratio    Lipid panel reflex to direct LDL Fasting    CBC with platelets    Comprehensive metabolic panel    TSH with free T4 reflex       3.  Work on weight loss  Regular exercise  Will follow up and/or notify patient of  results via My Chart to determine further need for followup   Follow up office visit in one year for annual health maintenance exam, sooner PRN.   Patient needs to follow up in if no improvement,or sooner if worsening of symptoms or other symptoms develop.          Patient Education   Preventive Care Advice   This is general advice given by our system to help you stay healthy. However, your care team may have specific advice just for you. Please talk to your care team about your preventive care needs.  Nutrition  Eat 5 or more servings of fruits and vegetables each day.  Try wheat bread, brown rice and whole grain pasta (instead of white bread, rice, and pasta).  Get enough calcium and vitamin D. Check the label on foods and aim for 100% of the RDA (recommended daily allowance).  Lifestyle  Exercise at least 150 minutes each week  (30 minutes a day, 5 days a week).  Do muscle strengthening activities 2 days a week. These help control your weight and prevent disease.  No smoking.  Wear sunscreen to prevent skin cancer.  Have a dental exam and cleaning every 6 months.  Yearly exams  See your health care team every year to talk about:  Any changes in your health.  Any medicines your care team has prescribed.  Preventive care, family planning, and ways to prevent chronic diseases.  Shots (vaccines)   HPV shots  (up to age 26), if you've never had them before.  Hepatitis B shots (up to age 59), if you've never had them before.  COVID-19 shot: Get this shot when it's due.  Flu shot: Get a flu shot every year.  Tetanus shot: Get a tetanus shot every 10 years.  Pneumococcal, hepatitis A, and RSV shots: Ask your care team if you need these based on your risk.  Shingles shot (for age 50 and up)  General health tests  Diabetes screening:  Starting at age 35, Get screened for diabetes at least every 3 years.  If you are younger than age 35, ask your care team if you should be screened for diabetes.  Cholesterol test: At age 39, start having a cholesterol test every 5 years, or more often if advised.  Bone density scan (DEXA): At age 50, ask your care team if you should have this scan for osteoporosis (brittle bones).  Hepatitis C: Get tested at least once in your life.  STIs (sexually transmitted infections)  Before age 24: Ask your care team if you should be screened for STIs.  After age 24: Get screened for STIs if you're at risk. You are at risk for STIs (including HIV) if:  You are sexually active with more than one person.  You don't use condoms every time.  You or a partner was diagnosed with a sexually transmitted infection.  If you are at risk for HIV, ask about PrEP medicine to prevent HIV.  Get tested for HIV at least once in your life, whether you are at risk for HIV or not.  Cancer screening tests  Cervical cancer screening: If you have a cervix, begin getting regular cervical cancer screening tests starting at age 21.  Breast cancer scan (mammogram): If you've ever had breasts, begin having regular mammograms starting at age 40. This is a scan to check for breast cancer.  Colon cancer screening: It is important to start screening for colon cancer at age 45.  Have a colonoscopy test every 10 years (or more often if you're at risk) Or, ask your provider about stool tests like a FIT test every year or Cologuard test  every 3 years.  To learn more about your testing options, visit:   .  For help making a decision, visit:   https://bit.ly/ci39742.  Prostate cancer screening test: If you have a prostate, ask your care team if a prostate cancer screening test (PSA) at age 55 is right for you.  Lung cancer screening: If you are a current or former smoker ages 50 to 80, ask your care team if ongoing lung cancer screenings are right for you.  For informational purposes only. Not to replace the advice of your health care provider. Copyright   2023 [facility]. All rights reserved. Clinically reviewed by the [facility]. nxtControl 295682 - REV 01/24.

## 2024-08-08 NOTE — PROGRESS NOTES
Preventive Care Visit  [facility]  Denise VITALY Vincent CNP, Family Medicine  Aug 8, 2024      Assessment & Plan     Routine general medical examination at a health care facility    Essential hypertension with goal blood pressure less than 130/85  HTN Plan:  1)  Medication: continue current medication regimen unchanged  2)  Dietary sodium restriction  3)  Regular aerobic exercise  4)  Recheck in 1 year, sooner should new symptoms or   problems arise.  5) See todays orders.    Patient Education: Reviewed risks of hypertension and principles of   treatment.  - losartan (COZAAR) 100 MG tablet  Dispense: 90 tablet; Refill: 3  - Albumin Random Urine Quantitative with Creat Ratio  - Comprehensive metabolic panel    CARDIOVASCULAR SCREENING; LDL GOAL LESS THAN 130  - Lipid panel reflex to direct LDL Fasting    Screening for diabetes mellitus (DM)  - Comprehensive metabolic panel    Screening for thyroid disorder  - TSH with free T4 reflex    Screening for disorder of blood and blood-forming organs  - CBC with platelets    Class 2 severe obesity due to excess calories with serious comorbidity and body mass index (BMI) of 36.0 to 36.9 in adult (H)  Healthy diet and exercise reviewed.  Limit pop and juice intake.  Risks of obesity discussed.  Encourage exercise.  Limit TV/Computer/game time to one hour a day.        Patient has been advised of split billing requirements and indicates understanding: Yes        Counseling  Appropriate preventive services were addressed with this patient via screening, questionnaire, or discussion as appropriate for fall prevention, nutrition, physical activity, Tobacco-use cessation, weight loss and cognition.  Checklist reviewing preventive services available has been given to the patient.  Reviewed patient's diet, addressing concerns and/or questions.   The patient was instructed to see the dentist every 6 months.       FUTURE APPOINTMENTS:       - Follow-up for  annual visit or as needed  See Patient Instructions    Meghan Garcia is a 58 year old, presenting for the following:  Physical        8/8/2024     8:31 AM   Additional Questions   Roomed by Kelsea SILVA   Accompanied by self        Health Care Directive  Patient does not have a Health Care Directive or Living Will: Discussed advance care planning with patient; information given to patient to review.    Healthy Habits:     Getting at least 3 servings of Calcium per day:  Yes    Bi-annual eye exam:  Yes    Dental care twice a year:  NO    Sleep apnea or symptoms of sleep apnea:  None    Diet:  Regular (no restrictions)    Frequency of exercise:  None    Duration of exercise:  N/A    Taking medications regularly:  Yes    Barriers to taking medications:  None    Medication side effects:  Not applicable    Additional concerns today:  Yes          8/8/2024   General Health   How would you rate your overall physical health? Good   Feel stress (tense, anxious, or unable to sleep) Not at all            8/8/2024   Nutrition   Three or more servings of calcium each day? Yes   Diet: Regular (no restrictions)   How many servings of fruit and vegetables per day? (!) 2-3   How many sweetened beverages each day? 0-1            8/8/2024   Exercise   Days per week of moderate/strenous exercise Patient declined   Average minutes spent exercising at this level Patient declined            8/8/2024   Social Factors   Frequency of gathering with friends or relatives Patient declined   Worry food won't last until get money to buy more No   Food not last or not have enough money for food? No   Do you have housing? (Housing is defined as stable permanent housing and does not include staying ouside in a car, in a tent, in an abandoned building, in an overnight shelter, or couch-surfing.) Yes   Are you worried about losing your housing? No   Lack of transportation? No   Unable to get utilities (heat,electricity)? No            8/8/2024    Fall Risk   Fallen 2 or more times in the past year? No   Trouble with walking or balance? No             8/8/2024   Dental   Dentist two times every year? (!) NO            8/8/2024   TB Screening   Were you born outside of the [place]? Yes              Today's PHQ-2 Score:       3/11/2024     9:00 AM   PHQ-2 ( 1999 [employer])   Q1: Little interest or pleasure in doing things 0   Q2: Feeling down, depressed or hopeless 0   PHQ-2 Score 0   Q1: Little interest or pleasure in doing things Not at all   Q2: Feeling down, depressed or hopeless Not at all   PHQ-2 Score 0         8/8/2024   Substance Use   Alcohol more than 3/day or more than 7/wk Not Applicable   Do you use any other substances recreationally? No        Social History     Tobacco Use    Smoking status: Never     Passive exposure: Never    Smokeless tobacco: Never   Vaping Use    Vaping status: Never Used   Substance Use Topics    Alcohol use: Not Currently    Drug use: Not Currently           8/2/2023   LAST FHS-7 RESULTS   1st degree relative breast or ovarian cancer No   Any relative bilateral breast cancer No   Any male have breast cancer No   Any ONE woman have BOTH breast AND ovarian cancer No   Any woman with breast cancer before 50yrs No   2 or more relatives with breast AND/OR ovarian cancer No   2 or more relatives with breast AND/OR bowel cancer No           Mammogram Screening - Mammogram every 1-2 years updated in Health Maintenance based on mutual decision making        8/8/2024   STI Screening   New sexual partner(s) since last STI/HIV test? No        History of abnormal Pap smear: No - age 30- 64 PAP with HPV every 5 years recommended        Latest Ref Rng & Units 2/25/2022     7:29 AM   PAP / HPV   PAP  Negative for Intraepithelial Lesion or Malignancy (NILM)    HPV 16 DNA Negative Negative    HPV 18 DNA Negative Negative    Other HR HPV Negative Negative      ASCVD Risk   The 10-year ASCVD risk score (Junior MATIAS, et al., 2019) is:  6.1%    Values used to calculate the score:      Age: 58 years      Sex: Female      Is Non- : Yes      Diabetic: No      Tobacco smoker: No      Systolic Blood Pressure: 137 mmHg      Is BP treated: Yes      HDL Cholesterol: 82 mg/dL      Total Cholesterol: 221 mg/dL           Reviewed and updated as needed this visit by Provider                    Past Medical History:   Diagnosis Date    Chondromalacia of patella 4/26/2013     Past Surgical History:   Procedure Laterality Date    HRW HB PF ASPIR/INJ GANGLION CYST Left     wrist     Lab work is in process  Labs reviewed in EPIC  BP Readings from Last 3 Encounters:   08/08/24 137/85   08/06/24 (!) 136/92   07/15/24 130/86    Wt Readings from Last 3 Encounters:   08/08/24 94.4 kg (208 lb 3 oz)   08/06/24 94.3 kg (208 lb)   07/15/24 94.3 kg (208 lb)                  Patient Active Problem List   Diagnosis    Chondromalacia of patella    Intramural leiomyoma of uterus    Vitamin D deficiency    Acute right-sided low back pain without sciatica    Essential hypertension with goal blood pressure less than 130/85    Class 2 severe obesity due to excess calories with serious comorbidity in adult (H)     Past Surgical History:   Procedure Laterality Date    HRW HB PF ASPIR/INJ GANGLION CYST Left     wrist       Social History     Tobacco Use    Smoking status: Never     Passive exposure: Never    Smokeless tobacco: Never   Substance Use Topics    Alcohol use: Not Currently     Family History   Problem Relation Age of Onset    Hypertension Mother     Thyroid Disease Mother     Coronary Artery Disease Father     Breast Cancer Maternal Aunt     Ovarian Cancer Paternal Aunt     Diabetes No family hx of     Hyperlipidemia No family hx of     Kidney Disease No family hx of     Cerebrovascular Disease No family hx of     Depression No family hx of     Mental Illness No family hx of     Uterine Cancer No family hx of     Prostate Cancer No family hx of      Colorectal Cancer No family hx of     Pancreatic Cancer No family hx of     Lung Cancer No family hx of     Melanoma No family hx of     Autoimmune Disease No family hx of          Current Outpatient Medications   Medication Sig Dispense Refill    calcium carbonate-vitamin D (OSCAL) 500-5 MG-MCG tablet Take 1 tablet by mouth 2 times daily      losartan (COZAAR) 100 MG tablet Take 1 tablet (100 mg) by mouth daily 90 tablet 3    Vitamin D, Cholecalciferol, 25 MCG (1000 UT) CAPS        Allergies   Allergen Reactions    Acetaminophen Rash and Hives    Calcitonin ([place]) Anaphylaxis    Pyrimethamine Anaphylaxis     Pt gets diffuse swelling  (metakelfin)    Lisinopril Cough     Dry cough    Other Environmental Allergy      PN: LW Reaction: metacalfin for malaria in [facility]    Tuberculin, Ppd Other (See Comments)     Acute swelling, redness of the forearm       CONSTITUTIONAL:NEGATIVE for fever, chills, change in weight  INTEGUMENTARY/SKIN: NEGATIVE for worrisome rashes, moles or lesions  EYES: POSITIVE for waiting to have her surgery until she comes back from [facility] for 2 months  and NEGATIVE for diplopia and redness bilateral  ENT: NEGATIVE for ear, mouth and throat problems  RESP:NEGATIVE for significant cough or SOB  BREAST: NEGATIVE for masses, tenderness or discharge  CV: NEGATIVE for chest pain, palpitations or peripheral edema  GI: NEGATIVE for nausea, abdominal pain, heartburn, or change in bowel habits   menopausal female: amenorrhea, no unusual urinary symptoms, and no unusual vaginal symptoms  MUSCULOSKELETAL:POSITIVE  for back pain and will be seeing therapist  and NEGATIVE for joint swelling  and joint warmth   NEURO: NEGATIVE for weakness, dizziness or paresthesias  ENDOCRINE: NEGATIVE for temperature intolerance, skin/hair changes  HEME/ALLERGY/IMMUNE: NEGATIVE for bleeding problems  PSYCHIATRIC: NEGATIVE for changes in mood or affect        Objective    Exam  /85 (BP Location: Right arm, Patient  "Position: Sitting, Cuff Size: Adult Large)   Pulse 87   Temp 97.9  F (36.6  C) (Oral)   Resp 16   Ht 1.613 m (5' 3.5\")   Wt 94.4 kg (208 lb 3 oz)   SpO2 100%   BMI 36.30 kg/m     Estimated body mass index is 36.3 kg/m  as calculated from the following:    Height as of this encounter: 1.613 m (5' 3.5\").    Weight as of this encounter: 94.4 kg (208 lb 3 oz).  Wt Readings from Last 4 Encounters:   08/08/24 94.4 kg (208 lb 3 oz)   08/06/24 94.3 kg (208 lb)   07/15/24 94.3 kg (208 lb)   03/11/24 94.6 kg (208 lb 9.6 oz)       Physical Exam  GENERAL: alert, no distress, and obese  EYES: Eyes grossly normal to inspection and conjunctivae and sclerae normal  HENT: ear canals and TM's normal, nose and mouth without ulcers or lesions  NECK: no adenopathy, no asymmetry, masses, or scars  RESP: lungs clear to auscultation - no rales, rhonchi or wheezes  BREAST: normal without masses, tenderness or nipple discharge and no palpable axillary masses or adenopathy  CV: regular rate and rhythm, normal S1 S2, no S3 or S4, no murmur, click or rub, no peripheral edema  ABDOMEN: soft, nontender, no hepatosplenomegaly, no masses and bowel sounds normal   (female): deferred  MS: no gross musculoskeletal defects noted, no edema  SKIN: no suspicious lesions or rashes  NEURO: Normal strength and tone, mentation intact and speech normal  PSYCH: mentation appears normal, affect normal/bright  LYMPH: no cervical, supraclavicular, axillary, or inguinal adenopathy    Diagnostic Test Results:   Diagnostic Test Results:  Labs reviewed in Epic  Results for orders placed or performed in visit on 08/08/24   MA Screen Bilateral w/Aki     Status: Normal    Narrative    BILATERAL FULL FIELD DIGITAL SCREENING MAMMOGRAM WITH TOMOSYNTHESIS    Performed on: 8/8/24    Compared to: 08/02/2023, 04/04/2022, 09/12/2018, 09/12/2018, and   05/18/2017    Technique:  This study was evaluated with the assistance of Computer-Aided   Detection.  Breast " Tomosynthesis was used in interpretation.    Findings: There are scattered areas of fibroglandular density.  There is   no radiographic evidence of malignancy.     Impression    IMPRESSION: ACR BI-RADS Category 1: Negative    BREAST CANCER SCREENING RECOMMENDATION: Routine yearly mammography   beginning at age 40 or as discussed with your provider.    The results and recommendations of this examination will be communicated   to the patient.    I have personally reviewed the examination and initial interpretation  and I agree with the findings.      Migue Alcantara MD; Zakia Crawford MD    Based on the pre-exam history questionnaire and medical history, there may   be increased risk for developing breast cancer or other cancers in the   future. The contact for scheduling cancer genetic counseling for risk   assessment and possible genetic testing and supplemental screening is:   [phone].   Results for orders placed or performed in visit on 08/08/24   Lipid panel reflex to direct LDL Fasting     Status: Abnormal   Result Value Ref Range    Cholesterol 221 (H) <200 mg/dL    Triglycerides 57 <150 mg/dL    Direct Measure HDL 82 >=50 mg/dL    LDL Cholesterol Calculated 128 (H) <=100 mg/dL    Non HDL Cholesterol 139 (H) <130 mg/dL    Patient Fasting > 8hrs? Yes     Narrative    Cholesterol  Desirable:  <200 mg/dL    Triglycerides  Normal:  Less than 150 mg/dL  Borderline High:  150-199 mg/dL  High:  200-499 mg/dL  Very High:  Greater than or equal to 500 mg/dL    Direct Measure HDL  Female:  Greater than or equal to 50 mg/dL   Male:  Greater than or equal to 40 mg/dL    LDL Cholesterol  Desirable:  <100mg/dL  Above Desirable:  100-129 mg/dL   Borderline High:  130-159 mg/dL   High:  160-189 mg/dL   Very High:  >= 190 mg/dL    Non HDL Cholesterol  Desirable:  130 mg/dL  Above Desirable:  130-159 mg/dL  Borderline High:  160-189 mg/dL  High:  190-219 mg/dL  Very High:  Greater than or equal to 220 mg/dL   Albumin Random  Urine Quantitative with Creat Ratio     Status: None   Result Value Ref Range    Creatinine Urine mg/dL 166.0 mg/dL    Albumin Urine mg/L <12.0 mg/L    Albumin Urine mg/g Cr     Comprehensive metabolic panel     Status: Abnormal   Result Value Ref Range    Sodium 141 135 - 145 mmol/L    Potassium 4.2 3.4 - 5.3 mmol/L    Carbon Dioxide (CO2) 26 22 - 29 mmol/L    Anion Gap 9 7 - 15 mmol/L    Urea Nitrogen 13.8 6.0 - 20.0 mg/dL    Creatinine 0.78 0.51 - 0.95 mg/dL    GFR Estimate 88 >60 mL/min/1.73m2    Calcium 9.6 8.8 - 10.4 mg/dL    Chloride 106 98 - 107 mmol/L    Glucose 105 (H) 70 - 99 mg/dL    Alkaline Phosphatase 57 40 - 150 U/L    AST 31 0 - 45 U/L    ALT 25 0 - 50 U/L    Protein Total 7.0 6.4 - 8.3 g/dL    Albumin 4.3 3.5 - 5.2 g/dL    Bilirubin Total 0.9 <=1.2 mg/dL    Patient Fasting > 8hrs? Yes    TSH with free T4 reflex     Status: Normal   Result Value Ref Range    TSH 0.86 0.30 - 4.20 uIU/mL   CBC with platelets     Status: Normal   Result Value Ref Range    WBC Count 4.6 4.0 - 11.0 10e3/uL    RBC Count 4.80 3.80 - 5.20 10e6/uL    Hemoglobin 14.4 11.7 - 15.7 g/dL    Hematocrit 44.0 35.0 - 47.0 %    MCV 92 78 - 100 fL    MCH 30.0 26.5 - 33.0 pg    MCHC 32.7 31.5 - 36.5 g/dL    RDW 12.8 10.0 - 15.0 %    Platelet Count 325 150 - 450 10e3/uL        Signed Electronically by: VITALY Caldwell CNP

## 2024-08-16 ENCOUNTER — TELEPHONE (OUTPATIENT)
Dept: FAMILY MEDICINE | Facility: CLINIC | Age: 59
End: 2024-08-16
Payer: COMMERCIAL

## 2024-08-16 NOTE — TELEPHONE ENCOUNTER
Forms/Letter Request    Type of form/letter: OTHER: [facility]       Do we have the form/letter: Yes:  [facility], Claim #: [id number]    Who is the form from?  [facility]     Where did/will the form come from? form was faxed in    When is form/letter needed by:     How would you like the form/letter returned: Fax : [phone]

## 2024-08-16 NOTE — TELEPHONE ENCOUNTER
Completed form has been faxed to [phone] . Right-Fax reviewed to confirm form was sent without error.   [facility].

## 2024-08-18 NOTE — RESULT ENCOUNTER NOTE
James De Santiago,    Attached are your test results.  -LDL(bad) cholesterol level is elevated which can increase your heart disease risk.  A diet high in fat and simple carbohydrates, genetics and being overweight can contribute to this. ADVISE: exercising 150 minutes of aerobic exercise per week (30 minutes for 5 days per week or 50 minutes for 3 days per week are options) and eating a low saturated fat/low carbohydrate diet are helpful to improve this. In 12 months, you should recheck your fasting cholesterol panel by scheduling a lab-only appointment.  -Liver and gallbladder tests (ALT,AST, Alk phos,bilirubin) are normal.  -Kidney function (GFR) is normal.  -Sodium is normal.  -Potassium is normal.  -Calcium is normal.  -Glucose is slight elevated and may be a sign of early diabetes (prediabetes). ADVISE:: eating a low carbohydrate diet, exercising, trying to lose weight (if necessary) and rechecking your glucose level in 12 months.  -TSH (thyroid stimulating hormone) level is normal which indicates normal thyroid function.  -Microalbumin (urine protein) test is normal.  ADVISE: rechecking this annually.   Please contact us if you have any questions.    Denise Douglas, CNP

## 2024-12-04 ENCOUNTER — MYC REFILL (OUTPATIENT)
Dept: FAMILY MEDICINE | Facility: CLINIC | Age: 59
End: 2024-12-04
Payer: COMMERCIAL

## 2024-12-04 DIAGNOSIS — I10 ESSENTIAL HYPERTENSION WITH GOAL BLOOD PRESSURE LESS THAN 130/85: ICD-10-CM

## 2024-12-05 RX ORDER — LOSARTAN POTASSIUM 100 MG/1
100 TABLET ORAL DAILY
Qty: 90 TABLET | Refills: 3 | OUTPATIENT
Start: 2024-12-05

## 2025-01-02 PROBLEM — M54.50 ACUTE RIGHT-SIDED LOW BACK PAIN WITHOUT SCIATICA: Status: RESOLVED | Noted: 2023-12-18 | Resolved: 2025-01-02

## 2025-07-09 ENCOUNTER — PATIENT OUTREACH (OUTPATIENT)
Dept: CARE COORDINATION | Facility: CLINIC | Age: 60
End: 2025-07-09
Payer: COMMERCIAL

## 2025-08-05 ENCOUNTER — TELEPHONE (OUTPATIENT)
Dept: FAMILY MEDICINE | Facility: CLINIC | Age: 60
End: 2025-08-05
Payer: COMMERCIAL

## 2025-08-31 DIAGNOSIS — I10 ESSENTIAL HYPERTENSION WITH GOAL BLOOD PRESSURE LESS THAN 130/85: ICD-10-CM

## 2025-09-02 RX ORDER — LOSARTAN POTASSIUM 100 MG/1
100 TABLET ORAL DAILY
Qty: 90 TABLET | Refills: 3 | Status: SHIPPED | OUTPATIENT
Start: 2025-09-02